# Patient Record
Sex: FEMALE | Race: WHITE | NOT HISPANIC OR LATINO | Employment: FULL TIME | URBAN - METROPOLITAN AREA
[De-identification: names, ages, dates, MRNs, and addresses within clinical notes are randomized per-mention and may not be internally consistent; named-entity substitution may affect disease eponyms.]

---

## 2017-10-10 ENCOUNTER — COMMUNICATION - HEALTHEAST (OUTPATIENT)
Dept: FAMILY MEDICINE | Facility: CLINIC | Age: 27
End: 2017-10-10

## 2018-02-09 ENCOUNTER — COMMUNICATION - HEALTHEAST (OUTPATIENT)
Dept: FAMILY MEDICINE | Facility: CLINIC | Age: 28
End: 2018-02-09

## 2018-02-20 ENCOUNTER — OFFICE VISIT - HEALTHEAST (OUTPATIENT)
Dept: FAMILY MEDICINE | Facility: CLINIC | Age: 28
End: 2018-02-20

## 2018-02-20 DIAGNOSIS — E55.9 VITAMIN D DEFICIENCY: ICD-10-CM

## 2018-02-20 DIAGNOSIS — Z00.00 ROUTINE GENERAL MEDICAL EXAMINATION AT A HEALTH CARE FACILITY: ICD-10-CM

## 2018-02-20 DIAGNOSIS — Z78.9 FAMILY HISTORY UNKNOWN: ICD-10-CM

## 2018-02-20 DIAGNOSIS — Z30.41 ENCOUNTER FOR SURVEILLANCE OF CONTRACEPTIVE PILLS: ICD-10-CM

## 2018-02-20 ASSESSMENT — MIFFLIN-ST. JEOR: SCORE: 1109.64

## 2018-02-21 ENCOUNTER — AMBULATORY - HEALTHEAST (OUTPATIENT)
Dept: FAMILY MEDICINE | Facility: CLINIC | Age: 28
End: 2018-02-21

## 2018-04-10 ENCOUNTER — AMBULATORY - HEALTHEAST (OUTPATIENT)
Dept: LAB | Facility: CLINIC | Age: 28
End: 2018-04-10

## 2018-04-10 DIAGNOSIS — E55.9 VITAMIN D DEFICIENCY: ICD-10-CM

## 2018-04-10 DIAGNOSIS — Z00.00 ROUTINE GENERAL MEDICAL EXAMINATION AT A HEALTH CARE FACILITY: ICD-10-CM

## 2018-04-10 DIAGNOSIS — Z78.9 FAMILY HISTORY UNKNOWN: ICD-10-CM

## 2018-04-10 LAB
ALBUMIN SERPL-MCNC: 3.6 G/DL (ref 3.5–5)
ALP SERPL-CCNC: 106 U/L (ref 45–120)
ALT SERPL W P-5'-P-CCNC: 37 U/L (ref 0–45)
ANION GAP SERPL CALCULATED.3IONS-SCNC: 11 MMOL/L (ref 5–18)
AST SERPL W P-5'-P-CCNC: 20 U/L (ref 0–40)
BILIRUB SERPL-MCNC: 0.2 MG/DL (ref 0–1)
BUN SERPL-MCNC: 12 MG/DL (ref 8–22)
CALCIUM SERPL-MCNC: 9.2 MG/DL (ref 8.5–10.5)
CHLORIDE BLD-SCNC: 106 MMOL/L (ref 98–107)
CHOLEST SERPL-MCNC: 154 MG/DL
CO2 SERPL-SCNC: 20 MMOL/L (ref 22–31)
CREAT SERPL-MCNC: 0.63 MG/DL (ref 0.6–1.1)
FASTING STATUS PATIENT QL REPORTED: YES
GFR SERPL CREATININE-BSD FRML MDRD: >60 ML/MIN/1.73M2
GLUCOSE BLD-MCNC: 102 MG/DL (ref 70–125)
HBA1C MFR BLD: 5.5 % (ref 3.5–6)
HDLC SERPL-MCNC: 38 MG/DL
LDLC SERPL CALC-MCNC: 86 MG/DL
POTASSIUM BLD-SCNC: 3.7 MMOL/L (ref 3.5–5)
PROT SERPL-MCNC: 7.4 G/DL (ref 6–8)
SODIUM SERPL-SCNC: 137 MMOL/L (ref 136–145)
TRIGL SERPL-MCNC: 152 MG/DL
TSH SERPL DL<=0.005 MIU/L-ACNC: 1.1 UIU/ML (ref 0.3–5)

## 2018-04-11 ENCOUNTER — COMMUNICATION - HEALTHEAST (OUTPATIENT)
Dept: FAMILY MEDICINE | Facility: CLINIC | Age: 28
End: 2018-04-11

## 2018-04-11 LAB — 25(OH)D3 SERPL-MCNC: 70.2 NG/ML (ref 30–80)

## 2018-05-01 ENCOUNTER — OFFICE VISIT - HEALTHEAST (OUTPATIENT)
Dept: FAMILY MEDICINE | Facility: CLINIC | Age: 28
End: 2018-05-01

## 2018-05-01 DIAGNOSIS — Z12.4 SCREENING FOR CERVICAL CANCER: ICD-10-CM

## 2018-05-03 ENCOUNTER — COMMUNICATION - HEALTHEAST (OUTPATIENT)
Dept: FAMILY MEDICINE | Facility: CLINIC | Age: 28
End: 2018-05-03

## 2018-11-12 ENCOUNTER — OFFICE VISIT - HEALTHEAST (OUTPATIENT)
Dept: FAMILY MEDICINE | Facility: CLINIC | Age: 28
End: 2018-11-12

## 2018-11-12 DIAGNOSIS — R62.52 SHORT STATURE: ICD-10-CM

## 2018-11-12 DIAGNOSIS — F81.9 COGNITIVE DEVELOPMENTAL DELAY: ICD-10-CM

## 2018-11-12 DIAGNOSIS — E78.1 ABNORMALLY LOW HIGH DENSITY LIPOPROTEIN (HDL) CHOLESTEROL WITH HYPERTRIGLYCERIDEMIA: ICD-10-CM

## 2018-11-12 DIAGNOSIS — F43.23 ADJUSTMENT DISORDER WITH MIXED ANXIETY AND DEPRESSED MOOD: ICD-10-CM

## 2018-11-12 DIAGNOSIS — E78.6 ABNORMALLY LOW HIGH DENSITY LIPOPROTEIN (HDL) CHOLESTEROL WITH HYPERTRIGLYCERIDEMIA: ICD-10-CM

## 2019-02-04 ENCOUNTER — COMMUNICATION - HEALTHEAST (OUTPATIENT)
Dept: FAMILY MEDICINE | Facility: CLINIC | Age: 29
End: 2019-02-04

## 2019-02-04 DIAGNOSIS — Z30.41 ENCOUNTER FOR SURVEILLANCE OF CONTRACEPTIVE PILLS: ICD-10-CM

## 2019-04-10 ENCOUNTER — OFFICE VISIT - HEALTHEAST (OUTPATIENT)
Dept: FAMILY MEDICINE | Facility: CLINIC | Age: 29
End: 2019-04-10

## 2019-04-10 DIAGNOSIS — F43.23 ADJUSTMENT DISORDER WITH MIXED ANXIETY AND DEPRESSED MOOD: ICD-10-CM

## 2019-05-03 ENCOUNTER — COMMUNICATION - HEALTHEAST (OUTPATIENT)
Dept: FAMILY MEDICINE | Facility: CLINIC | Age: 29
End: 2019-05-03

## 2019-05-03 DIAGNOSIS — Z30.41 ENCOUNTER FOR SURVEILLANCE OF CONTRACEPTIVE PILLS: ICD-10-CM

## 2019-05-08 ENCOUNTER — OFFICE VISIT - HEALTHEAST (OUTPATIENT)
Dept: FAMILY MEDICINE | Facility: CLINIC | Age: 29
End: 2019-05-08

## 2019-05-08 DIAGNOSIS — F43.23 ADJUSTMENT DISORDER WITH MIXED ANXIETY AND DEPRESSED MOOD: ICD-10-CM

## 2019-05-08 ASSESSMENT — MIFFLIN-ST. JEOR: SCORE: 1135.13

## 2019-08-21 ENCOUNTER — OFFICE VISIT - HEALTHEAST (OUTPATIENT)
Dept: FAMILY MEDICINE | Facility: CLINIC | Age: 29
End: 2019-08-21

## 2019-08-21 DIAGNOSIS — N94.6 DYSMENORRHEA: ICD-10-CM

## 2019-08-21 ASSESSMENT — MIFFLIN-ST. JEOR: SCORE: 1130.6

## 2019-12-27 ENCOUNTER — COMMUNICATION - HEALTHEAST (OUTPATIENT)
Dept: FAMILY MEDICINE | Facility: CLINIC | Age: 29
End: 2019-12-27

## 2020-07-22 ENCOUNTER — COMMUNICATION - HEALTHEAST (OUTPATIENT)
Dept: FAMILY MEDICINE | Facility: CLINIC | Age: 30
End: 2020-07-22

## 2020-07-22 DIAGNOSIS — N94.6 DYSMENORRHEA: ICD-10-CM

## 2020-09-01 ENCOUNTER — AMBULATORY - HEALTHEAST (OUTPATIENT)
Dept: FAMILY MEDICINE | Facility: CLINIC | Age: 30
End: 2020-09-01

## 2020-09-01 DIAGNOSIS — Z20.822 SUSPECTED COVID-19 VIRUS INFECTION: ICD-10-CM

## 2020-09-03 ENCOUNTER — COMMUNICATION - HEALTHEAST (OUTPATIENT)
Dept: SCHEDULING | Facility: CLINIC | Age: 30
End: 2020-09-03

## 2020-09-09 ENCOUNTER — COMMUNICATION - HEALTHEAST (OUTPATIENT)
Dept: SCHEDULING | Facility: CLINIC | Age: 30
End: 2020-09-09

## 2020-10-02 ENCOUNTER — OFFICE VISIT - HEALTHEAST (OUTPATIENT)
Dept: FAMILY MEDICINE | Facility: CLINIC | Age: 30
End: 2020-10-02

## 2020-10-02 DIAGNOSIS — N94.6 DYSMENORRHEA: ICD-10-CM

## 2020-10-02 RX ORDER — LEVONORGESTREL/ETHIN.ESTRADIOL 0.1-0.02MG
1 TABLET ORAL DAILY
Qty: 84 TABLET | Refills: 3 | Status: SHIPPED | OUTPATIENT
Start: 2020-10-02 | End: 2021-08-29

## 2020-10-02 ASSESSMENT — PATIENT HEALTH QUESTIONNAIRE - PHQ9: SUM OF ALL RESPONSES TO PHQ QUESTIONS 1-9: 0

## 2021-05-27 ASSESSMENT — PATIENT HEALTH QUESTIONNAIRE - PHQ9: SUM OF ALL RESPONSES TO PHQ QUESTIONS 1-9: 0

## 2021-05-27 NOTE — PROGRESS NOTES
SUBJECTIVE: Chapis Urbano is a 28 y.o. female with:   Chief Complaint   Patient presents with     Follow-up     f/u med check for birth control, discuss mood/hormones from birth control     She has been taking Necon for birth control for several years.  Over the last few months she has been feeling down.  Yesterday she was feeling sad and started crying for no reason. She lost 2 family members recently and has been grieving a little.  Her mood issues have been going on for about 4 months.  She had trouble falling asleep and now she is using homeopathic remedy from lavender which helps.  Looses appetite around her period.  She has started working out again - [employer].  She did lose interest in exercise for a short time.  Sometimes struggle to go out with friends.  She sometimes does not want to go to her job but has not missed any days at work.  She sees a counselor on regular basis.  He recommended going up to 2x a week for her therapy but she does not feel it will be helpful.  She is reluctant to start on any medication. Wonders if the OCPs are causing mood issues.  She has been thinking about doing some journaling for her mood.  She denies any suicidal ideation or plan.    SH: Lives with 2 roommates.  Works at [employer].  She is close with parents who live in [place].    Patient Active Problem List   Diagnosis     Dysmenorrhea     Intellectual disability     Short stature      OBJECTIVE: /72 (Patient Site: Left Arm, Patient Position: Sitting, Cuff Size: Adult Regular)   Pulse 88   Resp 16   Wt 126 lb 12 oz (57.5 kg)   LMP 03/20/2019   Breastfeeding? No   BMI 29.46 kg/m   no distress  Psych Exam:  Mood: Mildly depressed  Affect: flat   Behavior: appropriate  ThoughtContent: logical  Judgement: appropriate  Insight: normal    PHQ-9 Total Score: 16 (4/10/2019  4:00 PM)    Chapis was seen today for follow-up.    Diagnoses and all orders for this visit:    Adjustment disorder with mixed  anxiety and depressed mood    At this point she is functioning ok so will try lifestyle/ supplements and recheck in 1 month.  She should call sooner if symptoms worsen.    Patient Instructions   Depression Resources:    Diet:  Whole foods, low sugar    Supplements:  Vitamin D  Fish oil- 1000 -2000 mg daily  B complex or multivitamin    Exercise     Keep a journal    Books:  The Chemistry of Shayy - by Dr. Benjamin Mobley  Unstuck - by Dr. Bradford Pruitt  The Mindful Way Through Depression - by Luis Diaz    Consider taking 5-HTP starting at 50 mg at bedtime.    Follow-up in 1 month                   Birgit Macias

## 2021-05-28 NOTE — PROGRESS NOTES
"SUBJECTIVE: Chapis Urbano is a 28 y.o. female with:  Chief Complaint   Patient presents with     Follow-up     depression    She is here to f/u on her mood.  She is doing better since last month.  Taking homeopathic remedy and also a supplement at bedtime but cannot tell me the name.  Her mother is a homeopath/ integrative physician.  Her moods fluctuate from day to day.  She has some issues related to her place of birth.  She was born in [facility] and came to [place] at age 6.  Has some memories of war there.  She has restarted counseling.  Not doing any journaling but plans to start soon.  Has some friends she can talk with.  Hoping to switch her job so she can work at [employer] in [place] which is closer to her home.    OBJECTIVE: /80 (Patient Site: Right Arm, Patient Position: Sitting, Cuff Size: Adult Regular)   Pulse 70   Ht 4' 7\" (1.397 m)   Wt 124 lb 1.9 oz (56.3 kg)   BMI 28.85 kg/m   no distress  PSYCH:  Awake, alert, and oriented x 3.  Mood and affect are appropriate.  Good eye contact.  Speech is normal.  No suicidal ideation.  No hallucinations.    PHQ-9 Total Score: 12 (5/8/2019  4:00 PM)    Chapis was seen today for follow-up.    Diagnoses and all orders for this visit:    Adjustment disorder with mixed anxiety and depressed mood    Doing better.  I think we can hold off on meds.  Continue with counseling/ start journaling/ supplements.  F/U CYRUS.    Birgit Macias    "

## 2021-05-28 NOTE — TELEPHONE ENCOUNTER
Refill Approved    Rx renewed per Medication Renewal Policy. Medication was last renewed on 2/24/19.    Tonie Renner, [facility] Triage/Med Refill 5/3/2019     Requested Prescriptions   Pending Prescriptions Disp Refills     NECON 7/7/7, 28, 0.5/0.75/1 mg- 35 mcg per tablet [Pharmacy Med Name: NECON 7/ 7/ 7 TABLETS 28S] 84 tablet 0     Sig: TAKE 1 TABLET BY MOUTH EVERY DAY       Oral Contraceptives Protocol Passed - 5/3/2019  9:02 AM        Passed - Visit with PCP or prescribing provider visit in last 12 months      Last office visit with prescriber/PCP: 4/10/2019 Birgit Macias MD OR same dept: 4/10/2019 Birgit Macias MD OR same specialty: 4/10/2019 Birgit Macias MD  Last physical: 11/21/2016 Last MTM visit: Visit date not found   Next visit within 3 mo: Visit date not found  Next physical within 3 mo: Visit date not found  Prescriber OR PCP: Birgit Macias MD  Last diagnosis associated with med order: 1. Encounter for surveillance of contraceptive pills  - NECON 7/7/7, 28, 0.5/0.75/1 mg- 35 mcg per tablet [Pharmacy Med Name: NECON 7/ 7/ 7 TABLETS 28S]; TAKE 1 TABLET BY MOUTH EVERY DAY  Dispense: 84 tablet; Refill: 0    If protocol passes may refill for 12 months if within 3 months of last provider visit (or a total of 15 months).

## 2021-05-31 VITALS — WEIGHT: 118.5 LBS | BODY MASS INDEX: 27.42 KG/M2 | HEIGHT: 55 IN

## 2021-05-31 NOTE — PROGRESS NOTES
"SUBJECTIVE: Chapis Urbano is a 28 y.o. female with:  Chief Complaint   Patient presents with     Contraception     states Notrel birth control is making her cramps worse, previously took Necon and that was effective     She is currently on Nortrel 7/7/7 for dysmenorrhea.  Took Necon in past and it worked well with no significant cramping.  She has withdrawal bleeding every 4 weeks.  She bleeds for 4-5 days.  She is having more cramps.  Also has had some nausea.  Had to use heating pad last time.  No changes in diet or lifestyle. She is not sexually active. She would like to try another pill.  No HTN/ history of clotting/ migraines.    OBJECTIVE: /70   Pulse 99   Ht 4' 7\" (1.397 m)   Wt 123 lb 1.9 oz (55.8 kg)   SpO2 96%   BMI 28.62 kg/m   no distress    Chapis was seen today for contraception.    Diagnoses and all orders for this visit:    Dysmenorrhea  -     levonorgestrel-ethinyl estradiol (AVIANE,ALESSE,LESSINA) 0.1-20 mg-mcg per tablet; Take 1 tablet by mouth daily.       Patient Instructions   You can take magnesium glycinate 400 mg daily for cramps.    Start on new birth control pill and update me in a couple of cycles on how you are doing.     Birgit Macias"

## 2021-05-31 NOTE — PATIENT INSTRUCTIONS - HE
You can take magnesium glycinate 400 mg daily for cramps.    Start on new birth control pill and update me in a couple of cycles on how you are doing.

## 2021-06-01 VITALS — BODY MASS INDEX: 27.43 KG/M2 | WEIGHT: 118 LBS

## 2021-06-02 VITALS — HEIGHT: 55 IN | WEIGHT: 124.12 LBS | BODY MASS INDEX: 28.72 KG/M2

## 2021-06-02 VITALS — WEIGHT: 126.75 LBS | BODY MASS INDEX: 29.46 KG/M2

## 2021-06-02 VITALS — BODY MASS INDEX: 28.12 KG/M2 | WEIGHT: 121 LBS

## 2021-06-03 VITALS — BODY MASS INDEX: 28.49 KG/M2 | WEIGHT: 123.12 LBS | HEIGHT: 55 IN

## 2021-06-04 NOTE — TELEPHONE ENCOUNTER
Mother calling - has concern that her OCPs are causing her to gain weight.      I advised pt come in for a physical.  We can check her weight/ do lab work and change OCPs if needed.

## 2021-06-04 NOTE — TELEPHONE ENCOUNTER
Who is calling:  Dr Kae Norman   Reason for Call:  Wanting to talk to Dr. Macias in regards to the patient  Date of last appointment with primary care: 08.21.19  Okay to leave a detailed message: Yes

## 2021-06-09 NOTE — TELEPHONE ENCOUNTER
Refill Approved    Rx renewed per Medication Renewal Policy. Medication was last renewed on 08/21/2019.  Last office visit was 08/21/2019 with PCP.  Note sent to pharmacy to schedule follow up in August.    Maris Amaya, [facility] Triage/Med Refill 7/24/2020     Requested Prescriptions   Pending Prescriptions Disp Refills     LESSINA 0.1-20 mg-mcg per tablet [Pharmacy Med Name: LESSINA TABLETS 28] 84 tablet 3     Sig: TAKE 1 TABLET BY MOUTH DAILY       Oral Contraceptives Protocol Passed - 7/22/2020  1:33 PM        Passed - Visit with PCP or prescribing provider visit in last 12 months      Last office visit with prescriber/PCP: 8/21/2019 Birgit Macias MD OR same dept: 8/21/2019 Birgit Macias MD OR same specialty: 8/21/2019 Birgit Macias MD  Last physical: 11/21/2016 Last MTM visit: Visit date not found   Next visit within 3 mo: Visit date not found  Next physical within 3 mo: Visit date not found  Prescriber OR PCP: Birgit Macias MD  Last diagnosis associated with med order: 1. Dysmenorrhea  - LESSINA 0.1-20 mg-mcg per tablet [Pharmacy Med Name: LESSINA TABLETS 28]; TAKE 1 TABLET BY MOUTH DAILY  Dispense: 84 tablet; Refill: 3    If protocol passes may refill for 12 months if within 3 months of last provider visit (or a total of 15 months).

## 2021-06-11 NOTE — TELEPHONE ENCOUNTER
Coronavirus (COVID-19) Notification    Lab Result   Lab test 2019-nCoV rRt-PCR OR SARS-COV-2 PCR    Nasopharyngeal AND/OR Oropharyngeal swab is NEGATIVE for 2019-nCoV RNA [OR] SARS-COV-2 RNA (COVID-19) RNA    Your result was negative. This means that we didn't find the virus that causes COVID-19 in your sample. A test may show negative when you do actually have the virus. This can happen when the virus is in the early stages of infection, before you feel illness symptoms.    If you have symptoms   Stay home and away from others (self-isolate) until you meet ALL of the guidelines below:    You've had no fever--and no medicine that reduces fever--for 1 full day (24 hours). And      Your other symptoms have gotten better. For example, your cough or breathing has improved. And     At least 10 days have passed since your symptoms started. (If you ve been told by a doctor that you have a weak immune system, wait 20 days.)     During this time:    Stay home. Don't go to work, school or anywhere else.     Stay in your own room, including for meals. Use your own bathroom if you can.    Stay away from others in your home. No hugging, kissing or shaking hands. No visitors.    Clean  high touch  surfaces often (doorknobs, counters, handles, etc.). Use a household cleaning spray or wipes. You can find a full list on the EPA website at www.epa.gov/pesticide-registration/list-n-disinfectants-use-against-sars-cov-2.    Cover your mouth and nose with a mask, tissue or other face covering to avoid spreading germs.    Wash your hands and face often with soap and water.    Going back to work  Check with your employer for any guidelines to follow for going back to work.  You are sent a letter for your Employer which will serve as formal document notice that you, the employee, tested negative for COVID-19, as of the testing date shown above.    If your symptoms worsen or other concerning symptoms, contact PCP, oncare or consider  returning to Emergency Dept.    Where can I get more information?    [facility]: [email or web address]    Coronavirus Basics: www.[email or web address]/diseases/coronavirus/basics.html    [facility] Hotline (537-379-3566)    Coty Rogers  Nurse Advisor

## 2021-06-12 NOTE — PROGRESS NOTES
"Chapis Urbano is a 29 y.o. female who is being evaluated via a billable telephone visit.      The patient has been notified of following:     \"This telephone visit will be conducted via a call between you and your physician/provider. We have found that certain health care needs can be provided without the need for a physical exam.  This service lets us provide the care you need with a short phone conversation.  If a prescription is necessary we can send it directly to your pharmacy.  If lab work is needed we can place an order for that and you can then stop by our lab to have the test done at a later time.    Telephone visits are billed at different rates depending on your insurance coverage. During this emergency period, for some insurers they may be billed the same as an in-person visit.  Please reach out to your insurance provider with any questions.    If during the course of the call the physician/provider feels a telephone visit is not appropriate, you will not be charged for this service.\"    Patient has given verbal consent to a Telephone visit? Yes    What phone number would you like to be contacted at? [phone]    Patient would like to receive their AVS by AVS Preference: Mail a copy.    Additional provider notes: no     SUBJECTIVE: Chapis Urbano is a 29 y.o. female with:  Chief Complaint   Patient presents with     Contraception    She is taking Lessina low dose estrogen OCP for dysmenorrhea.  She has light period every month.  Doing better on this pill- had irregular bleeding on Necon.  Her pap was in 2018.  She is not sexually active.      She feels well with no other issues.     SH: Works at [employer].  Lives with roommate.  Does not smoke.      Patient Active Problem List   Diagnosis     Dysmenorrhea     Intellectual disability     Short stature      OBJECTIVE: no distress      BP Readings from Last 3 Encounters:   08/21/19 108/70   05/08/19 110/80   04/10/19 118/72 "         Assessment/Plan:  1. Dysmenorrhea  Well controlled on low dose OCPs.  - levonorgestrel-ethinyl estradiol (LESSINA) 0.1-20 mg-mcg per tablet; Take 1 tablet by mouth daily.  Dispense: 84 tablet; Refill: 3    F/U for physical next year.    Phone call duration:  5 minutes    Noel Asher, [facility]   Birgit Macias

## 2021-06-16 PROBLEM — R62.52 SHORT STATURE: Status: ACTIVE | Noted: 2018-11-12

## 2021-06-16 NOTE — PROGRESS NOTES
Assessment and Plan:   1. Routine general medical examination at a health care facility  Well 26 y/o female.   - Comprehensive Metabolic Panel; Future  - Lipid Profile; Future  - Glycosylated Hemoglobin A1c; Future  - Vitamin D, Total (25-Hydroxy); Future    2. BMI 27.0-27.9,adult  Weight stable but elevated BMI. Patient to restart exercise and will add additional fruits and veggies throughout the day.   - Comprehensive Metabolic Panel; Future  - Lipid Profile; Future  - Glycosylated Hemoglobin A1c; Future  - Thyroid Stimulating Hormone (TSH); Future  - Vitamin D, Total (25-Hydroxy); Future    3. Vitamin D deficiency  On Vit D supplement. Check levels.   - Vitamin D, Total (25-Hydroxy); Future    4. Family history unknown  Family hx unknown. Elevated BMI. Unknown risk for diabetes, heart disease. Labs ordered.   - Comprehensive Metabolic Panel; Future  - Lipid Profile; Future  - Glycosylated Hemoglobin A1c; Future  - Thyroid Stimulating Hormone (TSH); Future  - Vitamin D, Total (25-Hydroxy); Future    5. Encounter for surveillance of contraceptive pills  Will continue same OCP. If spotting/breakthrough bleeding persists, consider changing OCP.   - norethindrone-ethinyl estradiol (NECON 7/7/7, 28,) 0.5/0.75/1 mg- 35 mcg per tablet; Take 1 tablet by mouth daily.  Dispense: 84 tablet; Refill: 3    Declines tetanus shot and HPV vaccine.   Patient will schedule appointment to return for pap.     The patient's current medical problems were reviewed.    The following high BMI interventions were performed this visit: encouragement to exercise and weight monitoring  The following health maintenance schedule was reviewed with the patient and provided in printed form in the after visit summary:   Health Maintenance   Topic Date Due     HPV VACCINES (1 of 3 - Female 3 Dose Series) 12/12/2003     TDAP ADULT ONE TIME DOSE  12/12/2010     ADVANCE DIRECTIVES DISCUSSED WITH PATIENT  12/12/2010     TD 18+ HE  07/22/2014      INFLUENZA VACCINE RULE BASED (1) 08/01/2017     PAP SMEAR  12/02/2017        Subjective:   Chief Complaint: Chapis Urbano is an 27 y.o. female here for an Annual Wellness visit.   HPI:  26 y/o female past medical history as below presents today for annual exam. Feeling well today overall. No CP or SOB. No fevers, chills, nausea, vomiting or diarrhea.    Would like to weigh less, weight has been fairly stable. Stopped exercising but plans to restart, does Eugenia. She doesn't think that she is eating enough fruits and vegetables.     On OCP which she is tolerating well and does not miss doses. Some spotting this month, no heavy bleeding. Denies risk for pregnancy. No abdominal pain or pelvic pain. No urinary symptoms. States has period currently.     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 She was adopted and does not know her family history.     Declines tetanus, HPV vaccines today.       Review of Systems:  Please see above.  The rest of the review of systems are negative for all systems.    Patient Care Team:  Birgit Macias MD as PCP - General (Family Medicine)  Mai Hall CNP as Nurse Practitioner (Nurse Practitioner)     Patient Active Problem List   Diagnosis     Dysmenorrhea     Intellectual disability     No past medical history on file.   No past surgical history on file.   No family history on file.   Social History     Social History     Marital status: Single     Spouse name: N/A     Number of children: N/A     Years of education: N/A     Occupational History     Not on file.     Social History Main Topics     Smoking status: Never Smoker     Smokeless tobacco: Never Used     Alcohol use 0.6 - 1.2 oz/week     1 - 2 Standard drinks or equivalent per week      Comment: Socially     Drug use: No     Sexual activity: Not on file     Other Topics Concern     Not on file     Social History Narrative      Current Outpatient Prescriptions  "  Medication Sig Dispense Refill     cholecalciferol, vitamin D3, 10,000 unit Tab Take 2 tablets by mouth daily.       norethindrone-ethinyl estradiol (NECON 7/7/7, 28,) 0.5/0.75/1 mg- 35 mcg per tablet Take 1 tablet by mouth daily. 84 tablet 3     No current facility-administered medications for this visit.       Objective:   Vital Signs:   Visit Vitals     /72 (Patient Site: Left Arm, Patient Position: Sitting, Cuff Size: Adult Regular)     Pulse 77     Resp 16     Ht 4' 7\" (1.397 m)     Wt 118 lb 8 oz (53.8 kg)     LMP 01/29/2018     SpO2 97%     Breastfeeding No     BMI 27.54 kg/m2        PHYSICAL EXAM  Physical Examination: General appearance - alert, well appearing, and in no distress  Mental status - alert, oriented to person, place, and time  Eyes - pupils equal and reactive, extraocular eye movements intact  Ears - bilateral TM's and external ear canals normal  Nose - normal and patent, no erythema, discharge or polyps  Mouth - mucous membranes moist, pharynx normal without lesions  Neck - supple, no significant adenopathy  Lymphatics - no palpable lymphadenopathy, no hepatosplenomegaly  Chest - clear to auscultation, no wheezes, rales or rhonchi, symmetric air entry  Heart - normal rate, regular rhythm, normal S1, S2, no murmurs, rubs, clicks or gallops  Abdomen - soft, nontender, nondistended, no masses or organomegaly  Breasts - breasts appear normal, no suspicious masses, no skin or nipple changes or axillary nodes  Neurological - alert, oriented, normal speech, no focal findings or movement disorder noted  Musculoskeletal - no joint tenderness, deformity or swelling  Extremities - peripheral pulses normal, no pedal edema, no clubbing or cyanosis  Skin - normal coloration and turgor, no rashes, no suspicious skin lesions noted      Assessment Results 2/20/2018   Activities of Daily Living No help needed   Instrumental Activities of Daily Living No help needed   Mini Cog Total Score 4   Some recent " data might be hidden     A Mini-Cog score of 0-2 suggests the possibility of dementia, score of 3-5 suggests no dementia

## 2021-06-17 NOTE — PROGRESS NOTES
[facility]  OFFICE VISIT - FAMILY MEDICINE     ASSESSMENT AND PLAN     1. Screening for cervical cancer  Normal exam today. No history of abnormal pap in the past. If normal result, repeat in 3 years.   Gynecologic Cytology (PAP Smear)       CHIEF COMPLAINT     Follow-up (pap today, missed pap during physicals )    HPI     Chapis Urbano is a 27 y.o. female with past medical history as below who presents today for cervical cancer screening. Seen in February for physical but did not have pap at that time. Declines screening for STD. No vaginal bleeding or discharge. No skin lesions or skin changes to genital area. Feeling well today overall. No CP or SOB. No fevers, chills, nausea, vomiting or diarrhea. Previously with some spotting on OCP, reports that spotting has stopped and no issues with OCP.         Review of Systems  12-point review of systems completed and as per HPI, otherwise negative.     PMSH   No past medical history on file.  No past surgical history on file.    PFSH   No family history on file.  Social History     Social History     Marital status: Single     Spouse name: N/A     Number of children: N/A     Years of education: N/A     Occupational History     Not on file.     Social History Main Topics     Smoking status: Never Smoker     Smokeless tobacco: Never Used     Alcohol use 0.6 - 1.2 oz/week     1 - 2 Standard drinks or equivalent per week      Comment: Socially     Drug use: No     Sexual activity: Not on file     Other Topics Concern     Not on file     Social History Narrative     Relevant history was reviewed with the patient today, unless noted in HPI, is not pertinent for this visit.    MEDICATIONS     Current Outpatient Prescriptions on File Prior to Visit   Medication Sig Dispense Refill     cholecalciferol, vitamin D3, 10,000 unit Tab Take 2 tablets by mouth daily.       norethindrone-ethinyl estradiol (NECON 7/7/7, 28,) 0.5/0.75/1 mg- 35 mcg per tablet Take  1 tablet by mouth daily. 84 tablet 3     No current facility-administered medications on file prior to visit.        OBJECTIVE   /72 (Patient Site: Left Arm, Patient Position: Sitting, Cuff Size: Adult Regular)  Pulse 85  Resp 16  Wt 118 lb (53.5 kg)  SpO2 98%  Breastfeeding? No  BMI 27.43 kg/m2    Physical Exam  Physical Examination: General appearance - alert, well appearing, and in no distress  Pelvic - normal external genitalia, vulva, vagina, cervix, uterus and adnexa      RESULTS/CONSULTS (Lab/Radiology)   No results found for this or any previous visit (from the past 168 hour(s)).    HEALTH MAINTENANCE / SCREENING     PHQ-2 Total Score: 0 (2/20/2018  3:37 PM), No Data Recorded,No Data Recorded    Health Maintenance   Topic Date Due     HPV VACCINES (1 of 3 - Female 3 Dose Series) 12/12/2003     TDAP ADULT ONE TIME DOSE  12/12/2010     ADVANCE DIRECTIVES DISCUSSED WITH PATIENT  12/12/2010     TD 18+ HE  07/22/2014     PAP SMEAR  12/02/2017     INFLUENZA VACCINE RULE BASED (Season Ended) 08/01/2018       Mai Hall, CNP  Family Medicine, [facility]

## 2021-06-21 NOTE — PROGRESS NOTES
SUBJECTIVE: Chapis Urbano is a 27 y.o. female with:  Chief Complaint   Patient presents with     Paperwork     Dental insurance [employer]    1) She is here with her mother who is her guardian for paperwork that needs to be completed so she can have dental insurance.  She carries diagnosis of developmental cognitive delay mild to moderate/ very short stature and adjustment disorder with anxiety and depression.  She is currently working limited hours at the [employer] at [facility] [employer] [facility] [employer]/ [employer].  Has employment support through [employer]. She enjoys her job.  Has no complaints today.    2) Labs- she did fasting labs in April.  Has borderline elevated glucose/ low HDL / elevated TG.  A1C was 5.5.  She tries to avoid carbs. Eats lot of vegetables.    3) HCM - She had pap done in 2018 that was normal.    OBJECTIVE: /70 (Patient Site: Right Arm, Patient Position: Sitting, Cuff Size: Adult Regular)   Pulse 71   Resp 16   Wt 121 lb (54.9 kg)   LMP 10/28/2018 (Approximate)   SpO2 100%   BMI 28.12 kg/m   no distress.  PSYCH:  Awake, alert, and oriented x 3.  Mood and affect are appropriate.  Good eye contact.  Speech is normal.  No suicidal ideation.  No hallucinations.    Lab Results   Component Value Date    CHOL 154 04/10/2018     Lab Results   Component Value Date    HDL 38 (L) 04/10/2018     Lab Results   Component Value Date    LDLCALC 86 04/10/2018     Lab Results   Component Value Date    TRIG 152 (H) 04/10/2018     No components found for: CHOLHDL  Results for orders placed or performed in visit on 04/10/18   Comprehensive Metabolic Panel   Result Value Ref Range    Sodium 137 136 - 145 mmol/L    Potassium 3.7 3.5 - 5.0 mmol/L    Chloride 106 98 - 107 mmol/L    CO2 20 (L) 22 - 31 mmol/L    Anion Gap, Calculation 11 5 - 18 mmol/L    Glucose 102 70 - 125 mg/dL    BUN 12 8 - 22 mg/dL    Creatinine 0.63 0.60 - 1.10 mg/dL    GFR MDRD Af Amer >60 >60 mL/min/1.73m2    GFR MDRD Non Af Amer >60 >60  mL/min/1.73m2    Bilirubin, Total 0.2 0.0 - 1.0 mg/dL    Calcium 9.2 8.5 - 10.5 mg/dL    Protein, Total 7.4 6.0 - 8.0 g/dL    Albumin 3.6 3.5 - 5.0 g/dL    Alkaline Phosphatase 106 45 - 120 U/L    AST 20 0 - 40 U/L    ALT 37 0 - 45 U/L       Chapis was seen today for paperwork.    Diagnoses and all orders for this visit:    Cognitive developmental delay- Form filled out to support ongoing dental care. She does not need any other resources right now.    Short stature    Adjustment disorder with mixed anxiety and depressed mood- Mood doing well.    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  F/U in spring for annual wellness visit.       Birgit Macias

## 2021-06-23 NOTE — TELEPHONE ENCOUNTER
Refill Approved    Rx renewed per Medication Renewal Policy. Medication was last renewed on 2.20.18.  90 day refill given and note needs physical.    Emperatriz Wright, [facility] Triage/Med Refill 2/4/2019     Requested Prescriptions   Pending Prescriptions Disp Refills     norethindrone-ethinyl estradiol (NECON 7/7/7, 28,) 0.5/0.75/1 mg- 35 mcg per tablet 84 tablet 3     Sig: Take 1 tablet by mouth daily.    Oral Contraceptives Protocol Passed - 2/4/2019 10:20 AM       Passed - Visit with PCP or prescribing provider visit in last 12 months     Last office visit with prescriber/PCP: 11/12/2018 Birgit Macias MD OR same dept: 11/12/2018 Birgit Macias MD OR same specialty: 11/12/2018 Birgit Macias MD  Last physical: 11/21/2016 Last MTM visit: Visit date not found   Next visit within 3 mo: Visit date not found  Next physical within 3 mo: Visit date not found  Prescriber OR PCP: Birgit Macias MD  Last diagnosis associated with med order: 1. Encounter for surveillance of contraceptive pills  - norethindrone-ethinyl estradiol (NECON 7/7/7, 28,) 0.5/0.75/1 mg- 35 mcg per tablet; Take 1 tablet by mouth daily.  Dispense: 84 tablet; Refill: 3    If protocol passes may refill for 12 months if within 3 months of last provider visit (or a total of 15 months).

## 2021-07-28 ENCOUNTER — OFFICE VISIT (OUTPATIENT)
Dept: URGENT CARE | Facility: URGENT CARE | Age: 31
End: 2021-07-28
Payer: OTHER MISCELLANEOUS

## 2021-07-28 ENCOUNTER — ANCILLARY PROCEDURE (OUTPATIENT)
Dept: GENERAL RADIOLOGY | Facility: CLINIC | Age: 31
End: 2021-07-28
Attending: STUDENT IN AN ORGANIZED HEALTH CARE EDUCATION/TRAINING PROGRAM
Payer: OTHER MISCELLANEOUS

## 2021-07-28 VITALS
HEIGHT: 55 IN | DIASTOLIC BLOOD PRESSURE: 84 MMHG | TEMPERATURE: 98.2 F | HEART RATE: 77 BPM | SYSTOLIC BLOOD PRESSURE: 118 MMHG | OXYGEN SATURATION: 97 % | RESPIRATION RATE: 14 BRPM | BODY MASS INDEX: 28.62 KG/M2

## 2021-07-28 DIAGNOSIS — M25.572 PAIN IN JOINT, ANKLE AND FOOT, LEFT: ICD-10-CM

## 2021-07-28 DIAGNOSIS — M25.572 PAIN IN JOINT, ANKLE AND FOOT, LEFT: Primary | ICD-10-CM

## 2021-07-28 PROCEDURE — 99214 OFFICE O/P EST MOD 30 MIN: CPT | Performed by: STUDENT IN AN ORGANIZED HEALTH CARE EDUCATION/TRAINING PROGRAM

## 2021-07-28 PROCEDURE — 73610 X-RAY EXAM OF ANKLE: CPT | Mod: LT | Performed by: RADIOLOGY

## 2021-07-28 NOTE — LETTER
[facility] [address] [place] [address]  Phone: [phone]    July 28, 2021        Chapis Norman  [address] [place] [address]          To whom it may concern:    RE: Chapis Norman    Patient was seen and treated today at our clinic and missed work.    Please contact me for questions or concerns.      Sincerely,        Rj Franco MD

## 2021-07-28 NOTE — PROGRESS NOTES
"There are no exam notes on file for this visit.  Chief Complaint   Patient presents with     Urgent Care     Musculoskeletal Problem     injury to left ankle on Monday, twisted it in a parking lot.      Blood pressure 118/84, pulse 77, temperature 98.2  F (36.8  C), temperature source Oral, resp. rate 14, height 1.397 m (4' 7\"), last menstrual period 06/20/2021, SpO2 97 %, not currently breastfeeding.           SUBJECTIVE       Chapis is a 30 year old  female with a PMH significant for:     Patient Active Problem List   Diagnosis     Dysmenorrhea     Intellectual disability     Short stature     She presents with complaints of right ankle pain after stepping in a pothole while working on Monday.  Reports rolling her ankle, but denies any popping, cracking, cracking sensations.  Since then, patient has had worsening pain, but denies any fevers, chills, numbness weakness.  Does have some pain with ambulation.  Patient has tried compression wraps with minimal improvement in her symptoms.        OBJECTIVE     Vitals:    07/28/21 1126   BP: 118/84   Pulse: 77   Resp: 14   Temp: 98.2  F (36.8  C)   TempSrc: Oral   SpO2: 97%   Height: 1.397 m (4' 7\")     Body mass index is 28.62 kg/m .    Constitutional: Awake, alert, cooperative, no acute distress, and appears stated age.  Musculoskeletal: Tenderness to palpation at the right distal tibia, lateral/medial malleolus, base of the fifth metatarsal, without any swelling or erythema, normal ankle dorsiflexion and extension.  Sensation intact.  2+ DP pulses with capillary refill  Neuropsychiatric: Normal affect, mood, orientation, memory and insight.    Results for orders placed or performed in visit on 07/28/21   XR Ankle Left G/E 3 Views     Status: None (Preliminary result)    Narrative    ANKLE LEFT THREE OR MORE VIEWS   7/28/2021 12:37 PM     HISTORY: Pain in joint, ankle and foot, left.    COMPARISON: None.      Impression    IMPRESSION: No evidence of acute fracture. " Mortise and talar dome are  intact.     DME (Durable Medical Equipment) Orders and Documentation  Orders Placed This Encounter   Procedures     Ankle/Foot Bracing Supplies Order      The patient was assessed and it was determined the patient is in need of the following listed DME Supplies/Equipment. Please complete supporting documentation below to demonstrate medical necessity.        ASSESSMENT AND PLAN     Chapis was seen today for urgent care and musculoskeletal problem.    Diagnoses and all orders for this visit:    Pain in joint, ankle and foot, left  Likely sprained. XR unremarkable. Recommend RICE and Tylenol/IBU. Ankle brace provided.   -     XR Ankle Left G/E 3 Views; Future  -     Ankle/Foot Bracing Supplies Order          Return if symptoms worsen or fail to improve.    Rj Franco MD  7/28/2021

## 2021-07-28 NOTE — PATIENT INSTRUCTIONS
Patient Education     Treating Ankle Sprains  Treatment will depend on how bad your sprain is. For a severe sprain, healing may take 3 months or more.  Right after your injury: Use R.I.C.E.    BIG: Rest: At first, keep weight off the ankle as much as you can. You may be given crutches to help you walk without putting weight on the ankle.    BIG: Ice: Put an ice pack on the ankle for 20 minutes. Remove the pack and wait at least 30 minutes. Repeat for up to 3 days. This helps reduce swelling.    BIG: Compression: To reduce swelling and keep the joint stable, you may need to wrap the ankle with an elastic bandage. For more severe sprains, you may need an ankle brace, a boot, or a cast.    BIG: Elevation: To reduce swelling, keep your ankle raised above your heart when you sit or lie down.  Medicine  Your healthcare provider may suggest oral nonsteroidal anti-inflammatory medicine (NSAIDs), such as ibuprofen. This relieves the pain and helps reduce swelling. Be sure to take your medicine as directed.  Exercises    After about 2 to 3 weeks, you may be given exercises to strengthen the ligaments and muscles in the ankle. Doing these exercises will help prevent another ankle sprain. Exercises may include standing on your toes and then on your heels and doing ankle curls.    Sit on the edge of a sturdy table or lie on your back.    Pull your toes toward you. Then point them away from you. Repeat for 2 to 3 minutes.  [employer] last reviewed this educational content on 1/1/2018 2000-2021 [employer]. All rights reserved. This information is not intended as a substitute for professional medical care. Always follow your healthcare professional's instructions.

## 2021-07-30 ENCOUNTER — OFFICE VISIT (OUTPATIENT)
Dept: URGENT CARE | Facility: URGENT CARE | Age: 31
End: 2021-07-30
Payer: OTHER MISCELLANEOUS

## 2021-07-30 VITALS
TEMPERATURE: 98.4 F | DIASTOLIC BLOOD PRESSURE: 78 MMHG | BODY MASS INDEX: 28.59 KG/M2 | OXYGEN SATURATION: 98 % | SYSTOLIC BLOOD PRESSURE: 124 MMHG | HEART RATE: 65 BPM | WEIGHT: 123 LBS

## 2021-07-30 DIAGNOSIS — S99.912A INJURY OF LEFT ANKLE, INITIAL ENCOUNTER: ICD-10-CM

## 2021-07-30 DIAGNOSIS — S93.402A SPRAIN OF LEFT ANKLE, UNSPECIFIED LIGAMENT, INITIAL ENCOUNTER: Primary | ICD-10-CM

## 2021-07-30 PROCEDURE — 99213 OFFICE O/P EST LOW 20 MIN: CPT | Performed by: PHYSICIAN ASSISTANT

## 2021-07-30 NOTE — PROGRESS NOTES
Assessment & Plan     Sprain of left ankle, unspecified ligament, initial encounter  Work comp filled out    Injury of left ankle, initial encounter    Follow up with PCP in 1 week     Nakul Calix PA-C  [facility]    Albert Nation is a 30 year old who presents for the following health issues     HPI     Left ankle injury, pain  Follow up  Needs paperwork filled out    Review of Systems   Constitutional, HEENT, cardiovascular, pulmonary, gi and gu systems are negative, except as otherwise noted.      Objective    /78   Pulse 65   Temp 98.4  F (36.9  C) (Tympanic)   Wt 55.8 kg (123 lb)   LMP 06/30/2021   SpO2 98%   BMI 28.59 kg/m    Body mass index is 28.59 kg/m .  Physical Exam   GENERAL: healthy, alert and no distress  MS: Positive for left ankle tenderness  SKIN: no suspicious lesions or rashes  NEURO: Normal strength and tone, mentation intact and speech normal

## 2021-08-12 ENCOUNTER — OFFICE VISIT (OUTPATIENT)
Dept: FAMILY MEDICINE | Facility: CLINIC | Age: 31
End: 2021-08-12
Payer: OTHER MISCELLANEOUS

## 2021-08-12 VITALS
DIASTOLIC BLOOD PRESSURE: 85 MMHG | BODY MASS INDEX: 30.91 KG/M2 | HEART RATE: 83 BPM | WEIGHT: 133 LBS | TEMPERATURE: 98.5 F | SYSTOLIC BLOOD PRESSURE: 116 MMHG | OXYGEN SATURATION: 96 %

## 2021-08-12 DIAGNOSIS — S93.402D SPRAIN OF LEFT ANKLE, UNSPECIFIED LIGAMENT, SUBSEQUENT ENCOUNTER: Primary | ICD-10-CM

## 2021-08-12 PROCEDURE — 99212 OFFICE O/P EST SF 10 MIN: CPT | Performed by: FAMILY MEDICINE

## 2021-08-12 NOTE — PROGRESS NOTES
Assessment & Plan     Sprain of left ankle, unspecified ligament, subsequent encounter  -Significantly improved  -Can go without splint  -Offered trial of PT, patient declined as symptoms are mostly resolved    DO JARAD [facility]    Albert Nation is a 30 year old who presents for the following health issues:    HPI      New patient to me. Here for follow-up on left ankle sprain that occurred at work several weeks ago on 7/26. States twisted her ankle at work after stepping into a pothole. Patient went to urgent care several days later. Had ankle xray that did not show any acute fracture. Has been wearing air cast since. States pain is much improved. Able to ambulate without an issue without splint. Denies numbness, tingling, weakness.     Review of Systems   CONSTITUTIONAL: NEGATIVE for fever, chills, change in weight  MUSCULOSKELETAL: NEGATIVE for significant arthralgias or myalgia  NEURO: NEGATIVE for weakness, dizziness or paresthesias      Objective    /85 (BP Location: Right arm, Patient Position: Sitting, Cuff Size: Adult Regular)   Pulse 83   Temp 98.5  F (36.9  C) (Oral)   Wt 60.3 kg (133 lb)   SpO2 96%   BMI 30.91 kg/m    Body mass index is 30.91 kg/m .  Physical Exam   GENERAL: healthy, alert and no distress  MS: no gross musculoskeletal defects noted, no edema. Left ankle with good ROM, no focal pain, swelling or deformity. Pulses intact in left foot.

## 2021-08-29 DIAGNOSIS — N94.6 DYSMENORRHEA: ICD-10-CM

## 2021-08-29 RX ORDER — LEVONORGESTREL AND ETHINYL ESTRADIOL 0.1-0.02MG
KIT ORAL
Qty: 84 TABLET | Refills: 3 | OUTPATIENT
Start: 2021-08-29 | End: 2022-07-07

## 2022-05-20 DIAGNOSIS — Z11.59 ENCOUNTER FOR SCREENING FOR OTHER VIRAL DISEASES: Primary | ICD-10-CM

## 2022-05-23 DIAGNOSIS — Z01.818 PRE-OP TESTING: Primary | ICD-10-CM

## 2022-05-27 ENCOUNTER — ANESTHESIA EVENT (OUTPATIENT)
Dept: SURGERY | Facility: AMBULATORY SURGERY CENTER | Age: 32
End: 2022-05-27
Payer: COMMERCIAL

## 2022-05-31 ENCOUNTER — HOSPITAL ENCOUNTER (OUTPATIENT)
Facility: AMBULATORY SURGERY CENTER | Age: 32
Discharge: HOME | End: 2022-05-31
Attending: OBSTETRICS & GYNECOLOGY
Payer: COMMERCIAL

## 2022-05-31 ENCOUNTER — ANESTHESIA (OUTPATIENT)
Dept: SURGERY | Facility: AMBULATORY SURGERY CENTER | Age: 32
End: 2022-05-31
Payer: COMMERCIAL

## 2022-05-31 VITALS
DIASTOLIC BLOOD PRESSURE: 58 MMHG | SYSTOLIC BLOOD PRESSURE: 90 MMHG | HEART RATE: 94 BPM | TEMPERATURE: 97.9 F | OXYGEN SATURATION: 94 % | RESPIRATION RATE: 16 BRPM | WEIGHT: 143 LBS | HEIGHT: 55 IN | BODY MASS INDEX: 33.09 KG/M2

## 2022-05-31 DIAGNOSIS — D06.9 CARCINOMA IN SITU OF CERVIX UTERI: ICD-10-CM

## 2022-05-31 LAB
HCG UR QL: NEGATIVE
INTERNAL QC OK POCT: NORMAL
POCT KIT EXPIRATION DATE: NORMAL
POCT KIT LOT NUMBER: NORMAL

## 2022-05-31 RX ORDER — LIDOCAINE 40 MG/G
CREAM TOPICAL
Status: DISCONTINUED | OUTPATIENT
Start: 2022-05-31 | End: 2022-06-01 | Stop reason: HOSPADM

## 2022-05-31 RX ORDER — SODIUM CHLORIDE, SODIUM LACTATE, POTASSIUM CHLORIDE, CALCIUM CHLORIDE 600; 310; 30; 20 MG/100ML; MG/100ML; MG/100ML; MG/100ML
INJECTION, SOLUTION INTRAVENOUS CONTINUOUS
Status: DISCONTINUED | OUTPATIENT
Start: 2022-05-31 | End: 2022-06-01 | Stop reason: HOSPADM

## 2022-05-31 RX ORDER — DEXAMETHASONE SODIUM PHOSPHATE 10 MG/ML
INJECTION, SOLUTION INTRAMUSCULAR; INTRAVENOUS PRN
Status: DISCONTINUED | OUTPATIENT
Start: 2022-05-31 | End: 2022-05-31

## 2022-05-31 RX ORDER — OXYCODONE HYDROCHLORIDE 5 MG/1
5 TABLET ORAL
Status: DISCONTINUED | OUTPATIENT
Start: 2022-05-31 | End: 2022-06-01 | Stop reason: HOSPADM

## 2022-05-31 RX ORDER — ACETAMINOPHEN 325 MG/1
975 TABLET ORAL ONCE
Status: DISCONTINUED | OUTPATIENT
Start: 2022-05-31 | End: 2022-06-01 | Stop reason: HOSPADM

## 2022-05-31 RX ORDER — ACETIC ACID 5 %
LIQUID (ML) MISCELLANEOUS PRN
Status: DISCONTINUED | OUTPATIENT
Start: 2022-05-31 | End: 2022-05-31 | Stop reason: HOSPADM

## 2022-05-31 RX ORDER — FENTANYL CITRATE 0.05 MG/ML
50 INJECTION, SOLUTION INTRAMUSCULAR; INTRAVENOUS
Status: DISCONTINUED | OUTPATIENT
Start: 2022-05-31 | End: 2022-06-01 | Stop reason: HOSPADM

## 2022-05-31 RX ORDER — HYDROMORPHONE HCL IN WATER/PF 6 MG/30 ML
0.4 PATIENT CONTROLLED ANALGESIA SYRINGE INTRAVENOUS EVERY 5 MIN PRN
Status: DISCONTINUED | OUTPATIENT
Start: 2022-05-31 | End: 2022-06-01 | Stop reason: HOSPADM

## 2022-05-31 RX ORDER — MEPERIDINE HYDROCHLORIDE 25 MG/ML
12.5 INJECTION INTRAMUSCULAR; INTRAVENOUS; SUBCUTANEOUS
Status: DISCONTINUED | OUTPATIENT
Start: 2022-05-31 | End: 2022-06-01 | Stop reason: HOSPADM

## 2022-05-31 RX ORDER — ONDANSETRON 2 MG/ML
INJECTION INTRAMUSCULAR; INTRAVENOUS PRN
Status: DISCONTINUED | OUTPATIENT
Start: 2022-05-31 | End: 2022-05-31

## 2022-05-31 RX ORDER — ACETAMINOPHEN 325 MG/1
975 TABLET ORAL ONCE
Status: COMPLETED | OUTPATIENT
Start: 2022-05-31 | End: 2022-05-31

## 2022-05-31 RX ORDER — FENTANYL CITRATE 0.05 MG/ML
50 INJECTION, SOLUTION INTRAMUSCULAR; INTRAVENOUS EVERY 5 MIN PRN
Status: DISCONTINUED | OUTPATIENT
Start: 2022-05-31 | End: 2022-06-01 | Stop reason: HOSPADM

## 2022-05-31 RX ORDER — OXYCODONE HYDROCHLORIDE 5 MG/1
5 TABLET ORAL EVERY 4 HOURS PRN
Status: DISCONTINUED | OUTPATIENT
Start: 2022-05-31 | End: 2022-06-01 | Stop reason: HOSPADM

## 2022-05-31 RX ORDER — ONDANSETRON 4 MG/1
4 TABLET, ORALLY DISINTEGRATING ORAL EVERY 30 MIN PRN
Status: DISCONTINUED | OUTPATIENT
Start: 2022-05-31 | End: 2022-06-01 | Stop reason: HOSPADM

## 2022-05-31 RX ORDER — ONDANSETRON 2 MG/ML
4 INJECTION INTRAMUSCULAR; INTRAVENOUS EVERY 30 MIN PRN
Status: DISCONTINUED | OUTPATIENT
Start: 2022-05-31 | End: 2022-06-01 | Stop reason: HOSPADM

## 2022-05-31 RX ORDER — FENTANYL CITRATE 50 UG/ML
INJECTION, SOLUTION INTRAMUSCULAR; INTRAVENOUS PRN
Status: DISCONTINUED | OUTPATIENT
Start: 2022-05-31 | End: 2022-05-31

## 2022-05-31 RX ORDER — IBUPROFEN 800 MG/1
800 TABLET, FILM COATED ORAL ONCE
Status: DISCONTINUED | OUTPATIENT
Start: 2022-05-31 | End: 2022-06-01 | Stop reason: HOSPADM

## 2022-05-31 RX ORDER — PROPOFOL 10 MG/ML
INJECTION, EMULSION INTRAVENOUS CONTINUOUS PRN
Status: DISCONTINUED | OUTPATIENT
Start: 2022-05-31 | End: 2022-05-31

## 2022-05-31 RX ORDER — LIDOCAINE HYDROCHLORIDE AND EPINEPHRINE 10; 10 MG/ML; UG/ML
INJECTION, SOLUTION INFILTRATION; PERINEURAL PRN
Status: DISCONTINUED | OUTPATIENT
Start: 2022-05-31 | End: 2022-05-31 | Stop reason: HOSPADM

## 2022-05-31 RX ORDER — KETOROLAC TROMETHAMINE 30 MG/ML
INJECTION, SOLUTION INTRAMUSCULAR; INTRAVENOUS PRN
Status: DISCONTINUED | OUTPATIENT
Start: 2022-05-31 | End: 2022-05-31

## 2022-05-31 RX ADMIN — ACETAMINOPHEN 975 MG: 325 TABLET ORAL at 06:55

## 2022-05-31 RX ADMIN — PROPOFOL 200 MCG/KG/MIN: 10 INJECTION, EMULSION INTRAVENOUS at 07:08

## 2022-05-31 RX ADMIN — ONDANSETRON 4 MG: 2 INJECTION INTRAMUSCULAR; INTRAVENOUS at 07:17

## 2022-05-31 RX ADMIN — DEXAMETHASONE SODIUM PHOSPHATE 4 MG: 10 INJECTION, SOLUTION INTRAMUSCULAR; INTRAVENOUS at 07:17

## 2022-05-31 RX ADMIN — FENTANYL CITRATE 50 MCG: 50 INJECTION, SOLUTION INTRAMUSCULAR; INTRAVENOUS at 07:16

## 2022-05-31 RX ADMIN — KETOROLAC TROMETHAMINE 15 MG: 30 INJECTION, SOLUTION INTRAMUSCULAR; INTRAVENOUS at 07:33

## 2022-05-31 RX ADMIN — SODIUM CHLORIDE, SODIUM LACTATE, POTASSIUM CHLORIDE, CALCIUM CHLORIDE: 600; 310; 30; 20 INJECTION, SOLUTION INTRAVENOUS at 07:01

## 2022-05-31 RX ADMIN — Medication 100 MCG: at 07:27

## 2022-05-31 RX ADMIN — FENTANYL CITRATE 50 MCG: 50 INJECTION, SOLUTION INTRAMUSCULAR; INTRAVENOUS at 07:10

## 2022-05-31 NOTE — INTERVAL H&P NOTE
"I have reviewed the surgical (or preoperative) H&P that is linked to this encounter, and examined the patient. There are no significant changes    Clinical Conditions Present on Arrival:  Clinically Significant Risk Factors Present on Admission                   # Obesity: Estimated body mass index is 33.24 kg/m  as calculated from the following:    Height as of this encounter: 1.397 m (4' 7\").    Weight as of this encounter: 64.9 kg (143 lb).       "

## 2022-05-31 NOTE — ANESTHESIA CARE TRANSFER NOTE
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    Anesthesia Type:   MAC     Note:    Oropharynx: oropharynx clear of all foreign objects and spontaneously breathing  Level of Consciousness: awake and drowsy  Oxygen Supplementation: face mask  Level of Supplemental Oxygen (L/min / FiO2): 6  Independent Airway: airway patency satisfactory and stable  Dentition: dentition unchanged  Vital Signs Stable: post-procedure vital signs reviewed and stable  Report to RN Given: handoff report given  Patient transferred to: Phase II    Handoff Report: Identifed the Patient, Identified the Reponsible Provider, Reviewed the pertinent medical history, Discussed the surgical course, Reviewed Intra-OP anesthesia mangement and issues during anesthesia, Set expectations for post-procedure period and Allowed opportunity for questions and acknowledgement of understanding      Vitals:  Vitals Value Taken Time   BP 83/54 05/31/22 0737   Temp 36.6  C (97.9  F) 05/31/22 0737   Pulse 80 05/31/22  0737   Resp 16 05/31/22 0737   SpO2 96 % 05/31/22 0737       Electronically Signed By: EMRE Garcia CRNA  May 31, 2022  7:39 AM

## 2022-05-31 NOTE — PROGRESS NOTES
Prior to discharge, offered to assist patient with getting dressed in accordance with [facility]'s Fall Prevention Policy ([id number]_[id number]) following procedures where anesthesia medications are given. These medications can cause coordination and balance issues. This also includes assistance with bathroom usage. Educated patient about safeguards to prevent falls but patient declined/refused assistance.    Isauro Darling RN, 5/31/2022, 8:06 AM

## 2022-05-31 NOTE — OP NOTE
OPERATIVE NOTE    Procedure date: 5/31/2022    Pre-procedure Dx: CIN3  Post- Procedure Dx: Same    Procedure: Loop electrosurgical excision procedure, vaginal biopsy    Surgeon: Ina Lowry MD    Anesthesia: MAC with local  EBL: 5 mL    Complications: None   Findings: Petechiae on anterior and posterior surfaces of the vagina. Acetowhite changes from 3-8 o clock near os.     Specimens: Cervix, ECC, vaginal biopsy.    Indications: Chapis Norman is a 31 year old who presented with abnormal pap smear and had JASWINDER 3 on colposcopic biopsy. She had petichaie on the anterior and posterior vaginal vault, improved from when she had her colposcopy after treating with estradiol cream.     Procedure: The patient was moved onto the OR table in supine position. MAC anesthesia was administered. The patient was placed in dorsal lithotomy position with yellow fin stirrups. A time out was performed.  A coated speculum was placed with suction attached. The above findings were noted. Acetic acid was applied to the cervix. 10 mL of 1% lidocaine with epi was used to placed a intracervical and paracervical block. A green loop was used to excise the cervix from 9 to 3 on 50/50 blend. This was sent to pathology. An endocervical curettage was performed with the endocervical curette. This was sent to pathology. Ball cautery was used to cauterize the LEEP bed. Hemostasis was ensured. The speculum was rotated and a Tischler forceps used to sample two locations of the anterior vaginal wall. These biopsy locations were superficial and treated with silver nitrate. Instruments were removed. The patient tolerated the procedure well.     Ina Lowry MD   5/31/2022 7:06 AM

## 2022-05-31 NOTE — ANESTHESIA POSTPROCEDURE EVALUATION
Patient: Chapis Norman    Procedure: Procedure(s):  LOOP ELECTROSURGICAL EXCISION PROCEDURE (LEEP) CONE BIOPSY WITH VAGINAL BIOPSY       Anesthesia Type:  MAC    Note:  Disposition: Outpatient   Postop Pain Control: Uneventful            Sign Out: Well controlled pain   PONV: No   Neuro/Psych: Uneventful            Sign Out: Acceptable/Baseline neuro status   Airway/Respiratory: Uneventful            Sign Out: Acceptable/Baseline resp. status   CV/Hemodynamics: Uneventful            Sign Out: Acceptable CV status; No obvious hypovolemia; No obvious fluid overload   Other NRE: NONE   DID A NON-ROUTINE EVENT OCCUR? No           Last vitals:  Vitals Value Taken Time   BP 90/58 05/31/22 0757   Temp 97.9  F (36.6  C) 05/31/22 0737   Pulse 85 05/31/22 0756   Resp 16 05/31/22 0757   SpO2 94 % 05/31/22 0757   Vitals shown include unvalidated device data.    Electronically Signed By: Sly To MD  May 31, 2022  9:03 AM

## 2022-05-31 NOTE — ANESTHESIA PREPROCEDURE EVALUATION
Anesthesia Pre-Procedure Evaluation    Patient: Chapis Norman   MRN: [id number] : 1990        Procedure : Procedure(s):  LOOP ELECTROSURGICAL EXCISION PROCEDURE (LEEP) CONE BIOPSY          History reviewed. No pertinent past medical history.   Past Surgical History:   Procedure Laterality Date     DENTAL SURGERY        No Known Allergies   Social History     Tobacco Use     Smoking status: Never Smoker     Smokeless tobacco: Never Used   Substance Use Topics     Alcohol use: Yes     Alcohol/week: 1.0 - 2.0 standard drink     Comment: Alcoholic Drinks/day: Socially      Wt Readings from Last 1 Encounters:   22 64.9 kg (143 lb)        Anesthesia Evaluation   Pt has had prior anesthetic.     No history of anesthetic complications       ROS/MED HX  ENT/Pulmonary:  - neg pulmonary ROS     Neurologic: Comment: Intellectual disability      Cardiovascular:  - neg cardiovascular ROS     METS/Exercise Tolerance: >4 METS    Hematologic:  - neg hematologic  ROS     Musculoskeletal: Comment: Short stature      GI/Hepatic:  - neg GI/hepatic ROS     Renal/Genitourinary:  - neg Renal ROS     Endo:     (+) Obesity (BMI 33),     Psychiatric/Substance Use:       Infectious Disease:       Malignancy: Comment: Carcinoma in situ of cervix uteri       Other:            Physical Exam    Airway        Mallampati: III   TM distance: > 3 FB   Neck ROM: full   Mouth opening: > 3 cm    Respiratory Devices and Support         Dental  no notable dental history         Cardiovascular   cardiovascular exam normal          Pulmonary   pulmonary exam normal                OUTSIDE LABS:  CBC: No results found for: WBC, HGB, HCT, PLT  BMP:   Lab Results   Component Value Date     04/10/2018    POTASSIUM 3.7 04/10/2018    CHLORIDE 106 04/10/2018    CO2 20 (L) 04/10/2018    BUN 12 04/10/2018    CR 0.63 04/10/2018     04/10/2018     COAGS: No results found for: PTT, INR, FIBR  POC:   Lab Results   Component Value Date     HCG Negative 05/31/2022     HEPATIC:   Lab Results   Component Value Date    ALBUMIN 3.6 04/10/2018    PROTTOTAL 7.4 04/10/2018    ALT 37 04/10/2018    AST 20 04/10/2018    ALKPHOS 106 04/10/2018    BILITOTAL 0.2 04/10/2018     OTHER:   Lab Results   Component Value Date    A1C 5.5 04/10/2018    MASSIMO 9.2 04/10/2018    TSH 1.10 04/10/2018       Anesthesia Plan    ASA Status:  2   NPO Status:  NPO Appropriate    Anesthesia Type: MAC.              Consents    Anesthesia Plan(s) and associated risks, benefits, and realistic alternatives discussed. Questions answered and patient/representative(s) expressed understanding.     - Discussed: Risks, Benefits and Alternatives for BOTH SEDATION and the PROCEDURE were discussed     - Discussed with:  Patient, Parent (Mother and/or Father)      - Extended Intubation/Ventilatory Support Discussed: No.      - Patient is DNR/DNI Status: No    Use of blood products discussed: No .     Postoperative Care    Pain management: Multi-modal analgesia.   PONV prophylaxis: Ondansetron (or other 5HT-3), Dexamethasone or Solumedrol     Comments:                Sly To MD

## 2022-05-31 NOTE — DISCHARGE INSTRUCTIONS
If you have any questions or concerns regarding your procedure, please contact Dr. Lowry, her office number is [phone].    You received 975 mg of acetaminophen (Tylenol) at 6:55 AM. Please do not take an additional dose of Tylenol until after 12:55 PM.    You received a medication called Toradol (a stronger IV ibuprofen) at 7:33 AM. Do NOT take any Ibuprofen / Advil / Motrin / Aleve / Naproxen or products containing Ibuprofen until 1:33 PM or later.    Discharge Instructions for Gynecological Procedures (i.e., D & C, Suction D & C or Laparoscopy, Hysteroscopy, LEEP or Cold Knife Cone Biopsy)     Activity:  You may resume normal activities as your abdominal discomfort disappears.  You may expect some discomfort under the ribs and shoulder area for the first 24 hours.  In nearly all cases, this will disappear shortly after the first day.  It is certainly permissible to climb stairs, and do ordinary, quiet activities.  More vigorous activities such as sports, and work may be resumed in 48 - 72 hours as seems to befit your situation.  You should not bathe for one week, but you may shower any time.     Office Visit:  Please call a day or two after your surgery to make an appointment in 1 - 2 weeks to discuss the results of your surgery and have your check-up.       When to call your healthcare provider:     Vaginal Discharge:  You may have some bloody vaginal discharge for as long as one week.  Pads may be used any time.  Avoid placing anything in your vagina (tampons, douches, intercourse) for two weeks.  Temperature:  If you develop a temperature elevation of 101.5 degrees or higher, please call immediately or go to the Emergency Room if after office hours.  Restrictions:  Due to the effects of general anesthesia, please do not drive a car, drink alcoholic beverages, or operate complex machinery in the first 24 hours following surgery.     If you develop any vomiting, or abdominal pain that is worsening over  time, or any vaginal bleeding heavier than usual menstrual period (more than one pad change each hour), please contact Dr. Lowry, her office number is [phone].    Discharge Instructions: After Your Surgery, regarding Anesthesia    You ve just had surgery. During surgery, you were given medicine called anesthesia to keep you relaxed and free of pain. After surgery, you may have some pain or nausea. This is common. Here are some tips for feeling better and getting well after surgery.    Going home    Your healthcare provider will show you how to take care of yourself when you go home. He or she will also answer your questions. Have an adult family member or friend drive you home. For the first 24 hours after your surgery:    Don't drive or use heavy equipment.  Don't make important decisions or sign legal papers.  Don't drink alcohol.  Have someone stay with you for the next 24 hours. He or she can watch for problems and help keep you safe.  Be sure to go to all follow-up visits with your healthcare provider. And rest after your surgery for as long as your healthcare provider tells you to.    Coping with pain    *Pain after surgery is normal and expected*    If you have pain after surgery, pain medicine will help you feel better. Take it as told, before pain becomes severe. Also, ask your healthcare provider or pharmacist about other ways to control pain. This might be with heat, ice, or relaxation. And follow any other instructions your surgeon or nurse gives you.    Tips for taking pain medicine    To get the best relief possible, remember these points:    Pain medicines can upset your stomach. Taking them with a little food may help.  Most pain relievers taken by mouth need at least 20 to 30 minutes to start to work.  Don't wait till your pain becomes severe before you take your medicine. Try to time your medicine so that you can take it before starting an activity. This might be before you get dressed, go  for a walk, or sit down for dinner.  Constipation is a common side effect of pain medicines. You can take medicines such as laxatives (Miralax) or stool softeners to help ease constipation. Drinking lots of fluids and eating foods such as fruits and vegetables that are high in fiber can also help.  Drinking alcohol and taking pain medicine can cause dizziness and slow your breathing. It can even be deadly. Don't drink alcohol while taking pain medicine.  Pain medicine can make you react more slowly to things. Don't drive or run machinery while taking pain medicine.  Your healthcare provider may tell you to take acetaminophen to help ease your pain. Ask him or her how much you are supposed to take each day. Acetaminophen or other pain relievers may interact with your prescription medicines or other over-the-counter (OTC) medicines. Some prescription medicines have acetaminophen and other ingredients. Using both prescription and OTC acetaminophen for pain can cause you to overdose. Read the labels on your OTC medicines with care. This will help you to clearly know the list of ingredients, how much to take, and any warnings. It may also help you not take too much acetaminophen. If you have questions or don't understand the information, ask your pharmacist or healthcare provider to explain it to you before you take the OTC medicine.    Managing nausea    Some people have an upset stomach after surgery. This is often because of anesthesia, pain, or pain medicine, or the stress of surgery. These tips will help you handle nausea and eat healthy foods as you get better. If you were on a special food plan before surgery, ask your healthcare provider if you should follow it while you get better. These tips may help:    Don't push yourself to eat. Your body will tell you when to eat and how much.  Start off with clear liquids and soup. They are easier to digest.  Next try semi-solid foods, such as mashed potatoes, applesauce,  and gelatin, as you feel ready.  Slowly move to solid foods. Don t eat fatty, rich, or spicy foods at first.  Don't force yourself to have 3 large meals a day. Instead eat smaller amounts more often.  Take pain medicines with a small amount of solid food, such as crackers or toast, to prevent nausea.    If you have obstructive sleep apnea    You were given anesthesia medicine during surgery to keep you comfortable and free of pain. After surgery, you may have more apnea spells because of this medicine and other medicines you were given. The spells may last longer than usual.   At home:    Keep using the continuous positive airway pressure (CPAP) device when you sleep. Unless your healthcare provider tells you not to, use it when you sleep, day or night. CPAP is a common device used to treat obstructive sleep apnea.  Talk with your provider before taking any pain medicine, muscle relaxants, or sedatives. Your provider will tell you about the possible dangers of taking these medicines.

## 2022-07-06 ENCOUNTER — ANESTHESIA EVENT (OUTPATIENT)
Dept: SURGERY | Facility: AMBULATORY SURGERY CENTER | Age: 32
End: 2022-07-06
Payer: COMMERCIAL

## 2022-07-06 RX ORDER — MAGNESIUM SULFATE HEPTAHYDRATE 40 MG/ML
4 INJECTION, SOLUTION INTRAVENOUS ONCE
Status: COMPLETED | OUTPATIENT
Start: 2022-07-06 | End: 2022-07-07

## 2022-07-06 NOTE — ANESTHESIA PREPROCEDURE EVALUATION
Anesthesia Pre-Procedure Evaluation    Patient: Chapis Norman   MRN: [id number] : 1990        Procedure : Procedure(s):  LAPAROSCOPIC ASSISTED VAGINAL HYSTERECTOMY BILATERAL SALPINGECTOMY, CYSTOSCOPY          No past medical history on file.   Past Surgical History:   Procedure Laterality Date     CONIZATION LEEP N/A 2022    Procedure: LOOP ELECTROSURGICAL EXCISION PROCEDURE (LEEP) CONE BIOPSY WITH VAGINAL BIOPSY;  Surgeon: Constance Lowry MD;  Location: [facility] OR     DENTAL SURGERY        No Known Allergies   Social History     Tobacco Use     Smoking status: Never Smoker     Smokeless tobacco: Never Used   Substance Use Topics     Alcohol use: Yes     Alcohol/week: 1.0 - 2.0 standard drink     Comment: Alcoholic Drinks/day: Socially      Wt Readings from Last 1 Encounters:   22 64.9 kg (143 lb)        Anesthesia Evaluation            ROS/MED HX  ENT/Pulmonary:  - neg pulmonary ROS     Neurologic:  - neg neurologic ROS     Cardiovascular:  - neg cardiovascular ROS     METS/Exercise Tolerance:     Hematologic:  - neg hematologic  ROS     Musculoskeletal:  - neg musculoskeletal ROS     GI/Hepatic:  - neg GI/hepatic ROS     Renal/Genitourinary:  - neg Renal ROS     Endo:  - neg endo ROS     Psychiatric/Substance Use: Comment: Intellectual disability      Infectious Disease:  - neg infectious disease ROS     Malignancy:  - neg malignancy ROS     Other:  - neg other ROS          Physical Exam    Airway  airway exam normal      Mallampati: II       Respiratory Devices and Support         Dental  no notable dental history         Cardiovascular   cardiovascular exam normal       Rhythm and rate: regular and normal     Pulmonary   pulmonary exam normal        breath sounds clear to auscultation           OUTSIDE LABS:  CBC: No results found for: WBC, HGB, HCT, PLT  BMP:   Lab Results   Component Value Date     04/10/2018    POTASSIUM 3.7 04/10/2018    CHLORIDE 106 04/10/2018     CO2 20 (L) 04/10/2018    BUN 12 04/10/2018    CR 0.63 04/10/2018     04/10/2018     COAGS: No results found for: PTT, INR, FIBR  POC:   Lab Results   Component Value Date    HCG Negative 05/31/2022     HEPATIC:   Lab Results   Component Value Date    ALBUMIN 3.6 04/10/2018    PROTTOTAL 7.4 04/10/2018    ALT 37 04/10/2018    AST 20 04/10/2018    ALKPHOS 106 04/10/2018    BILITOTAL 0.2 04/10/2018     OTHER:   Lab Results   Component Value Date    A1C 5.5 04/10/2018    MASSIMO 9.2 04/10/2018    TSH 1.10 04/10/2018       Anesthesia Plan    ASA Status:  2      Anesthesia Type: General.     - Airway: ETT   Induction: Intravenous.   Maintenance: Balanced.        Consents    Anesthesia Plan(s) and associated risks, benefits, and realistic alternatives discussed. Questions answered and patient/representative(s) expressed understanding.    - Discussed:     - Discussed with:  Patient      - Extended Intubation/Ventilatory Support Discussed: No.      - Patient is DNR/DNI Status: No    Use of blood products discussed: No .     Postoperative Care    Pain management: IV analgesics, Multi-modal analgesia, Oral pain medications.   PONV prophylaxis: Ondansetron (or other 5HT-3), Dexamethasone or Solumedrol     Comments:    Other Comments: GAETT  Magnesium cong op infusion  ketamine after induction  Possible scopolamine for PONV  Antiemetics  Tylenol po pre op  Toradol at the end of case if okay with surgeon  Consider background propofol  Soft bite block  Check COVID status            Sukhi Reid MD

## 2022-07-07 ENCOUNTER — HOSPITAL ENCOUNTER (OUTPATIENT)
Facility: AMBULATORY SURGERY CENTER | Age: 32
Discharge: HOME | End: 2022-07-07
Attending: OBSTETRICS & GYNECOLOGY
Payer: COMMERCIAL

## 2022-07-07 ENCOUNTER — ANESTHESIA (OUTPATIENT)
Dept: SURGERY | Facility: AMBULATORY SURGERY CENTER | Age: 32
End: 2022-07-07
Payer: COMMERCIAL

## 2022-07-07 VITALS
TEMPERATURE: 98 F | RESPIRATION RATE: 16 BRPM | SYSTOLIC BLOOD PRESSURE: 136 MMHG | OXYGEN SATURATION: 97 % | HEIGHT: 55 IN | WEIGHT: 144 LBS | BODY MASS INDEX: 33.33 KG/M2 | HEART RATE: 77 BPM | DIASTOLIC BLOOD PRESSURE: 88 MMHG

## 2022-07-07 DIAGNOSIS — D06.9 CARCINOMA IN SITU OF CERVIX UTERI: ICD-10-CM

## 2022-07-07 DIAGNOSIS — N94.6 DYSMENORRHEA: Primary | ICD-10-CM

## 2022-07-07 RX ORDER — CEFAZOLIN SODIUM 2 G/100ML
2 INJECTION, SOLUTION INTRAVENOUS SEE ADMIN INSTRUCTIONS
Status: DISCONTINUED | OUTPATIENT
Start: 2022-07-07 | End: 2022-07-08 | Stop reason: HOSPADM

## 2022-07-07 RX ORDER — OXYCODONE HYDROCHLORIDE 5 MG/1
5-10 TABLET ORAL EVERY 4 HOURS PRN
Qty: 12 TABLET | Refills: 0 | Status: SHIPPED | OUTPATIENT
Start: 2022-07-07 | End: 2022-07-07

## 2022-07-07 RX ORDER — HYDROMORPHONE HCL IN WATER/PF 6 MG/30 ML
0.2 PATIENT CONTROLLED ANALGESIA SYRINGE INTRAVENOUS EVERY 5 MIN PRN
Status: DISCONTINUED | OUTPATIENT
Start: 2022-07-07 | End: 2022-07-08 | Stop reason: HOSPADM

## 2022-07-07 RX ORDER — ACETAMINOPHEN 325 MG/1
975 TABLET ORAL ONCE
Status: DISCONTINUED | OUTPATIENT
Start: 2022-07-07 | End: 2022-07-08 | Stop reason: HOSPADM

## 2022-07-07 RX ORDER — FENTANYL CITRATE 0.05 MG/ML
25 INJECTION, SOLUTION INTRAMUSCULAR; INTRAVENOUS EVERY 5 MIN PRN
Status: DISCONTINUED | OUTPATIENT
Start: 2022-07-07 | End: 2022-07-08 | Stop reason: HOSPADM

## 2022-07-07 RX ORDER — IBUPROFEN 800 MG/1
800 TABLET, FILM COATED ORAL ONCE
Status: DISCONTINUED | OUTPATIENT
Start: 2022-07-07 | End: 2022-07-08 | Stop reason: HOSPADM

## 2022-07-07 RX ORDER — ONDANSETRON 4 MG/1
4 TABLET, ORALLY DISINTEGRATING ORAL EVERY 30 MIN PRN
Status: DISCONTINUED | OUTPATIENT
Start: 2022-07-07 | End: 2022-07-08 | Stop reason: HOSPADM

## 2022-07-07 RX ORDER — ONDANSETRON 2 MG/ML
INJECTION INTRAMUSCULAR; INTRAVENOUS PRN
Status: DISCONTINUED | OUTPATIENT
Start: 2022-07-07 | End: 2022-07-07

## 2022-07-07 RX ORDER — ONDANSETRON 2 MG/ML
4 INJECTION INTRAMUSCULAR; INTRAVENOUS EVERY 30 MIN PRN
Status: DISCONTINUED | OUTPATIENT
Start: 2022-07-07 | End: 2022-07-08 | Stop reason: HOSPADM

## 2022-07-07 RX ORDER — CEFAZOLIN SODIUM 2 G/100ML
2 INJECTION, SOLUTION INTRAVENOUS
Status: COMPLETED | OUTPATIENT
Start: 2022-07-07 | End: 2022-07-07

## 2022-07-07 RX ORDER — MEPERIDINE HYDROCHLORIDE 25 MG/ML
12.5 INJECTION INTRAMUSCULAR; INTRAVENOUS; SUBCUTANEOUS
Status: DISCONTINUED | OUTPATIENT
Start: 2022-07-07 | End: 2022-07-08 | Stop reason: HOSPADM

## 2022-07-07 RX ORDER — OXYCODONE HYDROCHLORIDE 5 MG/1
5 TABLET ORAL EVERY 4 HOURS PRN
Status: DISCONTINUED | OUTPATIENT
Start: 2022-07-07 | End: 2022-07-08 | Stop reason: HOSPADM

## 2022-07-07 RX ORDER — LIDOCAINE HYDROCHLORIDE 20 MG/ML
INJECTION, SOLUTION INFILTRATION; PERINEURAL PRN
Status: DISCONTINUED | OUTPATIENT
Start: 2022-07-07 | End: 2022-07-07

## 2022-07-07 RX ORDER — FENTANYL CITRATE 0.05 MG/ML
25 INJECTION, SOLUTION INTRAMUSCULAR; INTRAVENOUS
Status: DISCONTINUED | OUTPATIENT
Start: 2022-07-07 | End: 2022-07-08 | Stop reason: HOSPADM

## 2022-07-07 RX ORDER — SODIUM CHLORIDE, SODIUM LACTATE, POTASSIUM CHLORIDE, CALCIUM CHLORIDE 600; 310; 30; 20 MG/100ML; MG/100ML; MG/100ML; MG/100ML
INJECTION, SOLUTION INTRAVENOUS CONTINUOUS
Status: DISCONTINUED | OUTPATIENT
Start: 2022-07-07 | End: 2022-07-08 | Stop reason: HOSPADM

## 2022-07-07 RX ORDER — ACETAMINOPHEN 325 MG/1
975 TABLET ORAL ONCE
Status: COMPLETED | OUTPATIENT
Start: 2022-07-07 | End: 2022-07-07

## 2022-07-07 RX ORDER — KETOROLAC TROMETHAMINE 30 MG/ML
INJECTION, SOLUTION INTRAMUSCULAR; INTRAVENOUS PRN
Status: DISCONTINUED | OUTPATIENT
Start: 2022-07-07 | End: 2022-07-07

## 2022-07-07 RX ORDER — LIDOCAINE 40 MG/G
CREAM TOPICAL
Status: DISCONTINUED | OUTPATIENT
Start: 2022-07-07 | End: 2022-07-08 | Stop reason: HOSPADM

## 2022-07-07 RX ORDER — OXYCODONE HYDROCHLORIDE 5 MG/1
5-10 TABLET ORAL EVERY 4 HOURS PRN
Qty: 12 TABLET | Refills: 0 | Status: SHIPPED | OUTPATIENT
Start: 2022-07-07

## 2022-07-07 RX ORDER — FENTANYL CITRATE 50 UG/ML
INJECTION, SOLUTION INTRAMUSCULAR; INTRAVENOUS PRN
Status: DISCONTINUED | OUTPATIENT
Start: 2022-07-07 | End: 2022-07-07

## 2022-07-07 RX ORDER — OXYCODONE HYDROCHLORIDE 5 MG/1
5 TABLET ORAL
Status: DISCONTINUED | OUTPATIENT
Start: 2022-07-07 | End: 2022-07-08 | Stop reason: HOSPADM

## 2022-07-07 RX ORDER — PROPOFOL 10 MG/ML
INJECTION, EMULSION INTRAVENOUS PRN
Status: DISCONTINUED | OUTPATIENT
Start: 2022-07-07 | End: 2022-07-07

## 2022-07-07 RX ORDER — NEOSTIGMINE METHYLSULFATE 1 MG/ML
VIAL (ML) INJECTION PRN
Status: DISCONTINUED | OUTPATIENT
Start: 2022-07-07 | End: 2022-07-07

## 2022-07-07 RX ORDER — DEXAMETHASONE SODIUM PHOSPHATE 4 MG/ML
INJECTION, SOLUTION INTRA-ARTICULAR; INTRALESIONAL; INTRAMUSCULAR; INTRAVENOUS; SOFT TISSUE PRN
Status: DISCONTINUED | OUTPATIENT
Start: 2022-07-07 | End: 2022-07-07

## 2022-07-07 RX ORDER — GLYCOPYRROLATE 0.2 MG/ML
INJECTION, SOLUTION INTRAMUSCULAR; INTRAVENOUS PRN
Status: DISCONTINUED | OUTPATIENT
Start: 2022-07-07 | End: 2022-07-07

## 2022-07-07 RX ORDER — KETAMINE HYDROCHLORIDE 10 MG/ML
INJECTION INTRAMUSCULAR; INTRAVENOUS PRN
Status: DISCONTINUED | OUTPATIENT
Start: 2022-07-07 | End: 2022-07-07

## 2022-07-07 RX ORDER — BUPIVACAINE HYDROCHLORIDE 2.5 MG/ML
INJECTION, SOLUTION INFILTRATION; PERINEURAL PRN
Status: DISCONTINUED | OUTPATIENT
Start: 2022-07-07 | End: 2022-07-07 | Stop reason: HOSPADM

## 2022-07-07 RX ORDER — PROPOFOL 10 MG/ML
INJECTION, EMULSION INTRAVENOUS CONTINUOUS PRN
Status: DISCONTINUED | OUTPATIENT
Start: 2022-07-07 | End: 2022-07-07

## 2022-07-07 RX ADMIN — CEFAZOLIN SODIUM 2 G: 2 INJECTION, SOLUTION INTRAVENOUS at 07:05

## 2022-07-07 RX ADMIN — ONDANSETRON 4 MG: 2 INJECTION INTRAMUSCULAR; INTRAVENOUS at 07:11

## 2022-07-07 RX ADMIN — PROPOFOL 180 MCG/KG/MIN: 10 INJECTION, EMULSION INTRAVENOUS at 07:14

## 2022-07-07 RX ADMIN — OXYCODONE HYDROCHLORIDE 5 MG: 5 TABLET ORAL at 10:02

## 2022-07-07 RX ADMIN — SODIUM CHLORIDE, SODIUM LACTATE, POTASSIUM CHLORIDE, CALCIUM CHLORIDE: 600; 310; 30; 20 INJECTION, SOLUTION INTRAVENOUS at 08:04

## 2022-07-07 RX ADMIN — SODIUM CHLORIDE, SODIUM LACTATE, POTASSIUM CHLORIDE, CALCIUM CHLORIDE: 600; 310; 30; 20 INJECTION, SOLUTION INTRAVENOUS at 09:28

## 2022-07-07 RX ADMIN — FENTANYL CITRATE 25 MCG: 0.05 INJECTION, SOLUTION INTRAMUSCULAR; INTRAVENOUS at 09:16

## 2022-07-07 RX ADMIN — ONDANSETRON 4 MG: 2 INJECTION INTRAMUSCULAR; INTRAVENOUS at 09:08

## 2022-07-07 RX ADMIN — FENTANYL CITRATE 100 MCG: 50 INJECTION, SOLUTION INTRAMUSCULAR; INTRAVENOUS at 07:11

## 2022-07-07 RX ADMIN — Medication 10 MG: at 08:12

## 2022-07-07 RX ADMIN — Medication 30 MG: at 07:11

## 2022-07-07 RX ADMIN — LIDOCAINE HYDROCHLORIDE 3 ML: 20 INJECTION, SOLUTION INFILTRATION; PERINEURAL at 07:11

## 2022-07-07 RX ADMIN — KETOROLAC TROMETHAMINE 15 MG: 30 INJECTION, SOLUTION INTRAMUSCULAR; INTRAVENOUS at 08:42

## 2022-07-07 RX ADMIN — KETAMINE HYDROCHLORIDE 30 MG: 10 INJECTION INTRAMUSCULAR; INTRAVENOUS at 07:11

## 2022-07-07 RX ADMIN — DEXAMETHASONE SODIUM PHOSPHATE 10 MG: 4 INJECTION, SOLUTION INTRA-ARTICULAR; INTRALESIONAL; INTRAMUSCULAR; INTRAVENOUS; SOFT TISSUE at 07:11

## 2022-07-07 RX ADMIN — GLYCOPYRROLATE 0.5 MG: 0.2 INJECTION, SOLUTION INTRAMUSCULAR; INTRAVENOUS at 08:41

## 2022-07-07 RX ADMIN — Medication 3 MG: at 08:41

## 2022-07-07 RX ADMIN — ACETAMINOPHEN 975 MG: 325 TABLET ORAL at 06:25

## 2022-07-07 RX ADMIN — PROPOFOL 130 MG: 10 INJECTION, EMULSION INTRAVENOUS at 07:11

## 2022-07-07 RX ADMIN — MAGNESIUM SULFATE HEPTAHYDRATE 4 G: 40 INJECTION, SOLUTION INTRAVENOUS at 06:33

## 2022-07-07 RX ADMIN — FENTANYL CITRATE 25 MCG: 0.05 INJECTION, SOLUTION INTRAMUSCULAR; INTRAVENOUS at 09:23

## 2022-07-07 RX ADMIN — FENTANYL CITRATE 50 MCG: 50 INJECTION, SOLUTION INTRAMUSCULAR; INTRAVENOUS at 08:57

## 2022-07-07 RX ADMIN — Medication 50 MCG: at 08:03

## 2022-07-07 RX ADMIN — SODIUM CHLORIDE, SODIUM LACTATE, POTASSIUM CHLORIDE, CALCIUM CHLORIDE: 600; 310; 30; 20 INJECTION, SOLUTION INTRAVENOUS at 06:32

## 2022-07-07 NOTE — DISCHARGE INSTRUCTIONS
If you have any questions or concerns regarding your procedure, please contact Dr. Lowry, her office number is [phone].    You have received 975 mg of Acetaminophen (Tylenol) at 0625. Please do not take an additional dose of Tylenol until after 12:25pm     Do not exceed 4,000 mg of acetaminophen during a 24 hour period and keep in mind that acetaminophen can also be found in many over-the-counter cold medications as well as narcotics that may be given for pain.     You received an IV medication today called Toradol. You received this medication at 8:42am. This is a NSAID. Therefore, do not take any NSAIDS (Ibuprofen products, Advil, Motrin, etc) until 2:42pm.

## 2022-07-07 NOTE — ANESTHESIA CARE TRANSFER NOTE
Patient: Chapis Norman    Procedure: Procedure(s):  LAPAROSCOPIC ASSISTED VAGINAL HYSTERECTOMY BILATERAL SALPINGECTOMY, CYSTOSCOPY       Diagnosis: * No pre-op diagnosis entered *  Diagnosis Additional Information: No value filed.    Anesthesia Type:   General     Note:    Oropharynx: oropharynx clear of all foreign objects and spontaneously breathing  Level of Consciousness: drowsy  Oxygen Supplementation: face mask  Level of Supplemental Oxygen (L/min / FiO2): 6  Independent Airway: airway patency satisfactory and stable  Dentition: dentition unchanged  Vital Signs Stable: post-procedure vital signs reviewed and stable  Report to RN Given: handoff report given  Patient transferred to: PACU    Handoff Report: Identifed the Patient, Identified the Reponsible Provider, Reviewed the pertinent medical history, Discussed the surgical course, Reviewed Intra-OP anesthesia mangement and issues during anesthesia, Set expectations for post-procedure period and Allowed opportunity for questions and acknowledgement of understanding      Vitals:  Vitals Value Taken Time   /58 07/07/22 0858   Temp 98  F (36.7  C) 07/07/22 0858   Pulse 90 07/07/22 0858   Resp 16 07/07/22 0858   SpO2 98 % 07/07/22 0858       Electronically Signed By: EMRE Vance CRNA  July 7, 2022  9:01 AM

## 2022-07-07 NOTE — INTERVAL H&P NOTE
"I have reviewed the surgical (or preoperative) H&P that is linked to this encounter, and examined the patient. There are no significant changes    Clinical Conditions Present on Arrival:  Clinically Significant Risk Factors Present on Admission                   # Obesity: Estimated body mass index is 33.47 kg/m  as calculated from the following:    Height as of this encounter: 1.397 m (4' 7\").    Weight as of this encounter: 65.3 kg (144 lb).       "

## 2022-07-07 NOTE — ANESTHESIA PROCEDURE NOTES
Airway       Patient location during procedure: OR       Procedure Start/Stop Times: 7/7/2022 7:17 AM  Staff -        Anesthesiologist:  Sukhi Reid MD       CRNA: Therese Ceballos APRN CRNA       Performed By: anesthesiologistIndications and Patient Condition       Indications for airway management: cong-procedural       Induction type:intravenous       Mask difficulty assessment: 2 - vent by mask + OA or adjuvant +/- NMBA    Final Airway Details       Final airway type: endotracheal airway       Successful airway: ETT - single and Oral  Endotracheal Airway Details        ETT size (mm): 7.0       Cuffed: yes       Successful intubation technique: video laryngoscopy       VL Blade Size: Glidescope 3       Grade View of Cords: 1       Adjucts: stylet and tooth guard       Position: Left       Measured from: gums/teeth       Secured at (cm): 21    Post intubation assessment        Placement verified by: capnometry, equal breath sounds and chest rise        Number of attempts at approach: 2       Secured with: silk tape       Ease of procedure: easy       Dentition: Intact and Unchanged    Medication(s) Administered   Medication Administration Time: 7/7/2022 7:17 AM    Additional Comments       DLx1 per CRNA with Ramsey 2. Unable to obtain view. DL per Dr. Reid while waiting for Glidescope to arrive. Also unable to obtain view. Glidescope 3. Slight difficulty even with Glidescope.

## 2022-07-07 NOTE — ANESTHESIA POSTPROCEDURE EVALUATION
Patient: Chapis Norman    Procedure: Procedure(s):  LAPAROSCOPIC ASSISTED VAGINAL HYSTERECTOMY BILATERAL SALPINGECTOMY, CYSTOSCOPY       Anesthesia Type:  General    Note:  Disposition: Outpatient   Postop Pain Control: Uneventful            Sign Out: Well controlled pain   PONV: No   Neuro/Psych: Uneventful            Sign Out: Acceptable/Baseline neuro status   Airway/Respiratory: Uneventful            Sign Out: Acceptable/Baseline resp. status   CV/Hemodynamics: Uneventful            Sign Out: Acceptable CV status; No obvious hypovolemia; No obvious fluid overload   Other NRE: NONE   DID A NON-ROUTINE EVENT OCCUR? No           Last vitals:  Vitals Value Taken Time   /70 07/07/22 0930   Temp 98  F (36.7  C) 07/07/22 0858   Pulse 94 07/07/22 0930   Resp 16 07/07/22 0930   SpO2 94 % 07/07/22 0930       Electronically Signed By: Sukhi Reid MD  July 7, 2022  1:44 PM

## 2022-07-07 NOTE — OP NOTE
Operative Note    Patient: Chapis Norman  : 1990  MRN: [id number]    Date of Service: 2022    Preoperative diagnosis:   - CIN3  - Desires definitive management      Postoperative diagnosis:   - same as above    Procedure: Laparoscopic assisted vaginal hysterectomy, bilateral salpingectomy, cystoscopy    Surgeon: Ina Lowry MD  Assistants: Vida Ann    Anesthesia: GETA  Complications:  none  EBL: 50 cc  Urine: 500 cc of clear urine    Findings: Normal sized uterus in mid position on EUA, no intraabdominal pathology noted on scan of abdomen. Normal uterus, fallopian tubes, and ovaries.      Indications: Chapis Norman is a 31 year old female who had CIN3 with positive margins on LEEP, and has elected to proceed with hysterectomy.  We discussed that this would prevent her from ever carrying children. The risks, benefits and alternatives of surgery were discussed in detail with the patient and she agreed to proceed.  Informed consent was signed prior to the procedure.      Procedure:  The patient was taken to the operating room where she was prepped and draped in the usual sterile fashion. GETA was induced and found to be adequate. She was positioned to the dorsal lithotomy position with yellow-fin stirrups. Patient safety time out was performed. A sterile open sided speculum was placed in the patient's vagina and the cervix visualized.  A single toothed tenaculum was placed on the anterior lip of the cervix and used for traction.  The uterus was gently sounded to 7 cm and the Sonya  uterine manipulator was placed.    A 5 mm umbilical skin incision was made. The Veress needle was placed without difficulty with intraabdominal placement suggested with water drop test and an opening pressure was noted to be 2 mm of mercury. The abdomen was filled to a pressure of 15 mm Hg.  The Veress needle was removed and a 5 mm Visiport was placed. No intraabdominal injury was noted.  A 5 mm skin  incision was then made in the RLQ. Through this a second trochar sleeve was placed into the abdominal cavity using direct observation with the laparoscope.  A 5 mm incision was then made in the LLQ and a trocar was placed under direct visualization.  A survey of the abdomen and pelvis was completed with the above noted findings.  The manipulator was used to elevate the uterus and bilateral ureters were noted to be vermiculating.  The right fallopian tube was elevated and the mesosalpinx inferior to the tube coagulated, and transected with the LigaSure.  The right round ligament was coagulated and transected with the LigaSure. The right uteroovarian ligament was then transected and the ovary fell away from the specimen. It was noted to be hemostatic. The vesicouterine peritoneum divided sharply and bluntly to create a bladder flap which was extended across the anterior surface of the uterus at vesicouterine junction. The right uterine artery was then completely isolated and coagulated using the LigaSure. It was then divided and initial blanching of the uterus was noted. Attention was then turned to the left fallopian tube which was similarly elevated, coagulated, and transected along the mesosalpinx. The left uteroovarian ligament was divided followed by division of the round ligament. The broad ligament was then divided and the bladder flap completed. The left uterine artery was then cauterized in three locations and transected in the middle with further blanching of the uterus.  After further dissection of the bladder from the cervix the colpotomy was performed with monopolar scissors and the uterus was completely .  The specimen was extracted vaginally. A V Loc was used to close the vagina in a running fashion from left to right and overlapping in the center.     The laparoscope was reintroduced and good hemostasis was noted. The pelvis was thoroughly irrigated and found to be hemostatic. The gas was  released from the abdomen and the trochars were removed.  The skin was closed with 4-0 Vicryl and steris.     A cystoscopy was then performed and efflux noted at bilateral ureteral orifices. No bladder injury noted.     The patient went to the postanesthesia recovery room in stable condition.     Ina Lowry MD, FACOG, [facility]  7/7/2022 9:07 AM

## 2023-05-09 ENCOUNTER — LAB REQUISITION (OUTPATIENT)
Dept: LAB | Facility: CLINIC | Age: 33
End: 2023-05-09
Payer: MEDICARE

## 2023-05-09 DIAGNOSIS — R87.619 UNSPECIFIED ABNORMAL CYTOLOGICAL FINDINGS IN SPECIMENS FROM CERVIX UTERI: ICD-10-CM

## 2023-05-09 PROCEDURE — 88305 TISSUE EXAM BY PATHOLOGIST: CPT | Mod: TC,ORL | Performed by: OBSTETRICS & GYNECOLOGY

## 2023-05-09 PROCEDURE — 88305 TISSUE EXAM BY PATHOLOGIST: CPT | Mod: 26 | Performed by: PATHOLOGY

## 2023-05-12 LAB
PATH REPORT.COMMENTS IMP SPEC: NORMAL
PATH REPORT.COMMENTS IMP SPEC: NORMAL
PATH REPORT.FINAL DX SPEC: NORMAL
PATH REPORT.GROSS SPEC: NORMAL
PATH REPORT.MICROSCOPIC SPEC OTHER STN: NORMAL
PATH REPORT.RELEVANT HX SPEC: NORMAL
PHOTO IMAGE: NORMAL

## 2024-05-14 ENCOUNTER — LAB REQUISITION (OUTPATIENT)
Dept: LAB | Facility: CLINIC | Age: 34
End: 2024-05-14
Payer: MEDICARE

## 2024-05-14 DIAGNOSIS — D06.9 CARCINOMA IN SITU OF CERVIX, UNSPECIFIED: ICD-10-CM

## 2024-05-14 PROCEDURE — 87624 HPV HI-RISK TYP POOLED RSLT: CPT | Mod: ORL | Performed by: OBSTETRICS & GYNECOLOGY

## 2024-05-14 PROCEDURE — G0145 SCR C/V CYTO,THINLAYER,RESCR: HCPCS | Mod: ORL | Performed by: OBSTETRICS & GYNECOLOGY

## 2024-05-20 LAB
BKR LAB AP GYN ADEQUACY: NORMAL
BKR LAB AP GYN INTERPRETATION: NORMAL
BKR LAB AP LMP: NORMAL
BKR LAB AP PREVIOUS ABNL DX: NORMAL
BKR LAB AP PREVIOUS ABNORMAL: NORMAL
PATH REPORT.COMMENTS IMP SPEC: NORMAL
PATH REPORT.COMMENTS IMP SPEC: NORMAL
PATH REPORT.RELEVANT HX SPEC: NORMAL

## 2024-05-23 LAB
HPV HR 12 DNA CVX QL NAA+PROBE: NEGATIVE
HPV16 DNA CVX QL NAA+PROBE: NEGATIVE
HPV18 DNA CVX QL NAA+PROBE: NEGATIVE
HUMAN PAPILLOMA VIRUS FINAL DIAGNOSIS: NORMAL